# Patient Record
Sex: FEMALE | Race: WHITE | NOT HISPANIC OR LATINO | Employment: FULL TIME | URBAN - METROPOLITAN AREA
[De-identification: names, ages, dates, MRNs, and addresses within clinical notes are randomized per-mention and may not be internally consistent; named-entity substitution may affect disease eponyms.]

---
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
## 2018-01-03 ENCOUNTER — PATIENT MESSAGE (OUTPATIENT)
Dept: MEDICAL GROUP | Facility: MEDICAL CENTER | Age: 18
End: 2018-01-03

## 2018-01-03 DIAGNOSIS — A09 TRAVELER'S DIARRHEA: ICD-10-CM

## 2018-01-03 RX ORDER — CIPROFLOXACIN 500 MG/1
500 TABLET, FILM COATED ORAL 2 TIMES DAILY
Qty: 6 TAB | Refills: 0 | Status: SHIPPED | OUTPATIENT
Start: 2018-01-03 | End: 2018-04-08

## 2018-01-03 NOTE — TELEPHONE ENCOUNTER
From: José Manuel Damon  To: HILARIA Medina  Sent: 1/3/2018 11:05 AM PST  Subject: Prescription Question    Josue Schrader! I leave for [place] in a week, and I was wondering if you could send in a prescription for travelers diarrhea for me. Thanks,  José Manuel

## 2018-04-08 ENCOUNTER — OFFICE VISIT (OUTPATIENT)
Dept: URGENT CARE | Facility: CLINIC | Age: 18
End: 2018-04-08
Payer: COMMERCIAL

## 2018-04-08 VITALS
RESPIRATION RATE: 16 BRPM | HEIGHT: 62 IN | WEIGHT: 132.8 LBS | DIASTOLIC BLOOD PRESSURE: 72 MMHG | HEART RATE: 80 BPM | TEMPERATURE: 98.6 F | BODY MASS INDEX: 24.44 KG/M2 | OXYGEN SATURATION: 98 % | SYSTOLIC BLOOD PRESSURE: 102 MMHG

## 2018-04-08 DIAGNOSIS — J02.9 PHARYNGITIS, UNSPECIFIED ETIOLOGY: ICD-10-CM

## 2018-04-08 LAB
INT CON NEG: NORMAL
INT CON POS: NORMAL
S PYO AG THROAT QL: NEGATIVE

## 2018-04-08 PROCEDURE — 99213 OFFICE O/P EST LOW 20 MIN: CPT | Performed by: NURSE PRACTITIONER

## 2018-04-08 PROCEDURE — 87880 STREP A ASSAY W/OPTIC: CPT | Performed by: NURSE PRACTITIONER

## 2018-04-08 ASSESSMENT — ENCOUNTER SYMPTOMS
STRIDOR: 0
HEADACHES: 1
VOMITING: 0
SWOLLEN GLANDS: 0
NECK PAIN: 0
DIARRHEA: 0
TROUBLE SWALLOWING: 0
SHORTNESS OF BREATH: 0
ABDOMINAL PAIN: 0
HOARSE VOICE: 1
COUGH: 0

## 2018-04-08 ASSESSMENT — PAIN SCALES - GENERAL: PAINLEVEL: NO PAIN

## 2018-04-08 NOTE — PROGRESS NOTES
"Subjective:      José Manuel Damon is a 17 y.o. female who presents with Pharyngitis (x 2 weeks / ear pain )       Denies past medical, surgical or family history that is significant to today's problem.   RX or OTC medications reviewed with patient today.     Allergies   Allergen Reactions   • Carmel [Drospirenone-Ethinyl Estradiol] Swelling     Facial swelling             Pharyngitis    This is a new problem. The current episode started 1 to 4 weeks ago. The problem has been waxing and waning. Neither side of throat is experiencing more pain than the other. Maximum temperature: subjective. The pain is at a severity of 3/10. The pain is mild. Associated symptoms include headaches, a hoarse voice and a plugged ear sensation. Pertinent negatives include no abdominal pain, congestion, coughing, diarrhea, drooling, ear discharge, ear pain, neck pain, shortness of breath, stridor, swollen glands, trouble swallowing or vomiting. She has tried nothing for the symptoms. The treatment provided mild relief.       Review of Systems   HENT: Positive for hoarse voice. Negative for congestion, drooling, ear discharge, ear pain and trouble swallowing.    Respiratory: Negative for cough, shortness of breath and stridor.    Gastrointestinal: Negative for abdominal pain, diarrhea and vomiting.   Musculoskeletal: Negative for neck pain.   Neurological: Positive for headaches.          Objective:     /72   Pulse 80   Temp 37 °C (98.6 °F)   Resp 16   Ht 1.575 m (5' 2\")   Wt 60.2 kg (132 lb 12.8 oz)   SpO2 98%   BMI 24.29 kg/m²      Physical Exam   Constitutional: She is oriented to person, place, and time. Vital signs are normal. She appears well-developed and well-nourished.  Non-toxic appearance. No distress.   HENT:   Head: Normocephalic.   Right Ear: Hearing, tympanic membrane, external ear and ear canal normal.   Left Ear: Hearing, tympanic membrane, external ear and ear canal normal.   Nose: Nose normal. Right sinus " exhibits no frontal sinus tenderness. Left sinus exhibits no frontal sinus tenderness.   Mouth/Throat: Uvula is midline, oropharynx is clear and moist and mucous membranes are normal. No oropharyngeal exudate, posterior oropharyngeal edema, posterior oropharyngeal erythema or tonsillar abscesses. Tonsils are 2+ on the right. Tonsils are 2+ on the left. No tonsillar exudate.   Eyes: Lids are normal. Pupils are equal, round, and reactive to light.   Neck: Trachea normal, normal range of motion, full passive range of motion without pain and phonation normal. Neck supple.   Cardiovascular: Normal rate, regular rhythm and normal pulses.    Pulmonary/Chest: Effort normal and breath sounds normal. No respiratory distress.   Abdominal: Soft.   Musculoskeletal: Normal range of motion.   Lymphadenopathy:        Head (right side): No submental, no submandibular and no tonsillar adenopathy present.        Head (left side): No submental, no submandibular and no tonsillar adenopathy present.     She has no cervical adenopathy.        Right: No supraclavicular adenopathy present.        Left: No supraclavicular adenopathy present.   Neurological: She is alert and oriented to person, place, and time. She has normal reflexes.   Skin: Skin is warm and dry.   Psychiatric: She has a normal mood and affect. Her speech is normal and behavior is normal.   Nursing note and vitals reviewed.       strep : negative        Assessment/Plan:     1. Pharyngitis, unspecified etiology  Alum & Mag Hydroxide-Simeth (MBX) Suspension    POCT Rapid Strep A     Return to clinic or PCP  4-5  days if current symptoms are not resolving in a satisfactory manner or sooner if new or worsening symptoms occur.   Patient advised differential diagnoses, signs and symptoms which would warrant further evaluation and /or emergent evaluation.     Patient was in agreement with this treatment plan and seemed to understand without barriers.   Questions were encouraged  and answered to patients satisfaction.   Pt education done. Aftercare instructions given to pt/ caregiver. Questions answered. Verbalizes good understanding.   Salt water gargles BID and prn. Suggested 1/4 to 1/2 teaspoon (1.5 to 3.0 g) of salt per one cup (8 ounces or 250 mL) of warm water    Keep well hydrated

## 2018-06-27 ENCOUNTER — OFFICE VISIT (OUTPATIENT)
Dept: MEDICAL GROUP | Age: 18
End: 2018-06-27
Payer: COMMERCIAL

## 2018-06-27 VITALS
OXYGEN SATURATION: 96 % | BODY MASS INDEX: 25.4 KG/M2 | TEMPERATURE: 99.4 F | HEART RATE: 62 BPM | DIASTOLIC BLOOD PRESSURE: 60 MMHG | SYSTOLIC BLOOD PRESSURE: 102 MMHG | WEIGHT: 138 LBS | HEIGHT: 62 IN

## 2018-06-27 DIAGNOSIS — J40 BRONCHITIS: ICD-10-CM

## 2018-06-27 PROCEDURE — 99214 OFFICE O/P EST MOD 30 MIN: CPT | Performed by: FAMILY MEDICINE

## 2018-06-27 RX ORDER — PREDNISONE 20 MG/1
TABLET ORAL
Qty: 12 TAB | Refills: 0 | Status: SHIPPED | OUTPATIENT
Start: 2018-06-27 | End: 2019-03-26

## 2018-06-27 RX ORDER — AMOXICILLIN 875 MG/1
875 TABLET, COATED ORAL 2 TIMES DAILY
Qty: 20 TAB | Refills: 0 | Status: SHIPPED | OUTPATIENT
Start: 2018-06-27 | End: 2019-03-26

## 2018-06-27 ASSESSMENT — PATIENT HEALTH QUESTIONNAIRE - PHQ9: CLINICAL INTERPRETATION OF PHQ2 SCORE: 0

## 2018-06-27 NOTE — PROGRESS NOTES
This medical record contains text that has been entered with the assistance of computer voice recognition and dictation software.  Therefore, it may contain unintended errors in text, spelling, punctuation, or grammar    Chief Complaint   Patient presents with   • Cough     x2 weeks, not productive   • Nasal Congestion     x2 weeks         José Manuel Damon is a 17 y.o. female here evaluation and management of: Cough Raynaud's itchy eyes ear pain congestion times 2 weeks      HPI:     Bronchitis  NEW PROBLEM    Patient states for the past 2 weeks she has been coughing of yellowish sputum, this is associated with a runny nose bilateral ear pain feeling feverish although she did not measure it.  In addition to having generalized malaise she also feels nauseous.  Denies any headaches no numbness or tingling no neck stiffness no nausea no vomiting no diarrhea no abdominal pain no blood in the stool no vaginal bleeding, she is able to tolerate p.o. fluids.the cough which is causing soreness in her throat. The patient feels congested through the  frontal sinuses as well as maxillary sinuses. Has been trying over-the-counter remedies with no improvement.  The patient  denies any neck stiffness no change in vision no severe headaches, no nausea no vomiting is able to tolerate by mouth fluids.    Current medicines (including changes today)  Current Outpatient Prescriptions   Medication Sig Dispense Refill   • amoxicillin (AMOXIL) 875 MG tablet Take 1 Tab by mouth 2 times a day. 20 Tab 0   • predniSONE (DELTASONE) 20 MG Tab Take 3 tabs po for 2 days then 2 tabs for 2 days then 1 for 2 days 12 Tab 0   • Alum & Mag Hydroxide-Simeth (MBX) Suspension Take 5 mL by mouth every 6 hours as needed. 120 mL 0   • fluticasone (FLONASE) 50 MCG/ACT nasal spray USE 2 SPRAYS NASALLY EVERY DAY 1 Bottle 5   • BLISOVI FE 1/20 1-20 MG-MCG per tablet Take  by mouth every day.       No current facility-administered medications for this visit.   "    She  has a past medical history of Allergy and Anxiety.  She  has a past surgical history that includes tonsillectomy and adenoidectomy and tonsillectomy.  Social History   Substance Use Topics   • Smoking status: Former Smoker   • Smokeless tobacco: Never Used   • Alcohol use 0.6 oz/week     1 Cans of beer per week     Social History     Social History Narrative   • No narrative on file     Family History   Problem Relation Age of Onset   • Hypertension Mother    • Hyperlipidemia Mother    • Psychiatry Father    • Hypertension Maternal Grandmother    • Diabetes Maternal Grandmother    • Heart Disease Maternal Grandfather      Family Status   Relation Status   • Mother Alive   • Father Alive   • Sister Alive   • Maternal Grandmother Alive   • Maternal Grandfather    • Paternal Grandmother Alive         ROS    Please see hpi     All other systems reviewed and are negative     Objective:     Blood pressure 102/60, pulse 62, temperature 37.4 °C (99.4 °F), height 1.575 m (5' 2\"), weight 62.6 kg (138 lb), SpO2 96 %, not currently breastfeeding. Body mass index is 25.24 kg/m².  Physical Exam:    Constitutional: Alert, no distress.  Skin: Warm, dry, good turgor, no rashes in visible areas.  Eye: Equal, round and reactive, conjunctiva clear, lids normal.  ENMT: Lips without lesions, good dentition, oropharynx clear.  Neck: Trachea midline, no masses, no thyromegaly. No cervical or supraclavicular lymphadenopathy.  Respiratory: Unlabored respiratory effort, lungs clear to auscultation, no wheezes, no ronchi.  Cardiovascular: Normal S1, S2, no murmur, no edema.  Abdomen: Soft, non-tender, no masses, no hepatosplenomegaly.  Psych: Alert and oriented x3, normal affect and mood.          Assessment and Plan:   The following treatment plan was discussed      1. Bronchitis    The patient is nontoxic in appearance  Afebrile and  hemodynamically stable  There is no clinical indication for  imaging  We will proceed with " conservative management as an outpatient      - amoxicillin (AMOXIL) 875 MG tablet; Take 1 Tab by mouth 2 times a day.  Dispense: 20 Tab; Refill: 0  - predniSONE (DELTASONE) 20 MG Tab; Take 3 tabs po for 2 days then 2 tabs for 2 days then 1 for 2 days  Dispense: 12 Tab; Refill: 0        HEALTH MAINTENANCE:    Instructed to Follow up in clinic or ER for worsening symptoms, difficulty breathing, lack of expected recovery, or should new symptoms or problems arise.    Followup: Return in about 4 weeks (around 7/25/2018) for Reevaluation, With PCP.       Once again this medical record contains text that has been entered with the assistance of computer voice recognition and dictation software.  Therefore, it may contain unintended errors in text, spelling, punctuation, or grammar

## 2018-06-27 NOTE — ASSESSMENT & PLAN NOTE
NEW PROBLEM    Patient states for the past 2 weeks she has been coughing of yellowish sputum, this is associated with a runny nose bilateral ear pain feeling feverish although she did not measure it.  In addition to having generalized malaise she also feels nauseous.  Denies any headaches no numbness or tingling no neck stiffness no nausea no vomiting no diarrhea no abdominal pain no blood in the stool no vaginal bleeding, she is able to tolerate p.o. fluids.the cough which is causing soreness in her throat. The patient feels congested through the  frontal sinuses as well as maxillary sinuses. Has been trying over-the-counter remedies with no improvement.  The patient  denies any neck stiffness no change in vision no severe headaches, no nausea no vomiting is able to tolerate by mouth fluids.

## 2018-07-05 ENCOUNTER — PATIENT MESSAGE (OUTPATIENT)
Dept: MEDICAL GROUP | Facility: MEDICAL CENTER | Age: 18
End: 2018-07-05

## 2018-07-05 RX ORDER — CEFDINIR 300 MG/1
300 CAPSULE ORAL 2 TIMES DAILY
Qty: 20 CAP | Refills: 0 | Status: SHIPPED | OUTPATIENT
Start: 2018-07-05 | End: 2019-03-26

## 2018-07-29 ENCOUNTER — PATIENT MESSAGE (OUTPATIENT)
Dept: MEDICAL GROUP | Facility: MEDICAL CENTER | Age: 18
End: 2018-07-29

## 2018-07-30 NOTE — TELEPHONE ENCOUNTER
From: José Manuel Damon  To: HILARIA Medina  Sent: 7/29/2018 3:30 PM PDT  Subject: Non-Urgent Medical Question    I called and got an appointment scheduled with you.

## 2018-08-15 ENCOUNTER — OFFICE VISIT (OUTPATIENT)
Dept: MEDICAL GROUP | Facility: MEDICAL CENTER | Age: 18
End: 2018-08-15
Payer: COMMERCIAL

## 2018-08-15 VITALS
WEIGHT: 135 LBS | OXYGEN SATURATION: 95 % | HEIGHT: 62 IN | SYSTOLIC BLOOD PRESSURE: 100 MMHG | DIASTOLIC BLOOD PRESSURE: 60 MMHG | BODY MASS INDEX: 24.84 KG/M2 | HEART RATE: 94 BPM | RESPIRATION RATE: 16 BRPM | TEMPERATURE: 99.1 F

## 2018-08-15 DIAGNOSIS — Z23 NEED FOR VACCINATION: ICD-10-CM

## 2018-08-15 DIAGNOSIS — Z00.00 ANNUAL PHYSICAL EXAM: ICD-10-CM

## 2018-08-15 DIAGNOSIS — J30.1 SEASONAL ALLERGIC RHINITIS DUE TO POLLEN: ICD-10-CM

## 2018-08-15 PROCEDURE — 90471 IMMUNIZATION ADMIN: CPT | Performed by: NURSE PRACTITIONER

## 2018-08-15 PROCEDURE — 99395 PREV VISIT EST AGE 18-39: CPT | Mod: 25 | Performed by: NURSE PRACTITIONER

## 2018-08-15 PROCEDURE — 90621 MENB-FHBP VACC 2/3 DOSE IM: CPT | Performed by: NURSE PRACTITIONER

## 2018-08-15 RX ORDER — FLUTICASONE PROPIONATE 50 MCG
1 SPRAY, SUSPENSION (ML) NASAL DAILY
Qty: 1 BOTTLE | Refills: 1 | Status: SHIPPED | OUTPATIENT
Start: 2018-08-15 | End: 2019-03-26

## 2018-08-15 NOTE — PROGRESS NOTES
Chief Complaint   Patient presents with   • Annual Exam     José Manuel Damon is a 17 y.o. female here for annual exam.  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  She is exercising regularly, generally following a healthy diet.  Using Flonase for her allergies which is working well.  On oral contraceptive and also doing well with this, followed by OB/GYN.  Not currently sexually active    No problem-specific Assessment & Plan notes found for this encounter.    Current medicines (including changes today)  Current Outpatient Prescriptions   Medication Sig Dispense Refill   • fluticasone (FLONASE) 50 MCG/ACT nasal spray Spray 1 Spray in nose every day. 1 Bottle 1   • BLISOVI FE 1/20 1-20 MG-MCG per tablet Take  by mouth every day.     • cefdinir (OMNICEF) 300 MG Cap Take 1 Cap by mouth 2 times a day. 20 Cap 0   • amoxicillin (AMOXIL) 875 MG tablet Take 1 Tab by mouth 2 times a day. 20 Tab 0   • predniSONE (DELTASONE) 20 MG Tab Take 3 tabs po for 2 days then 2 tabs for 2 days then 1 for 2 days 12 Tab 0   • Alum & Mag Hydroxide-Simeth (MBX) Suspension Take 5 mL by mouth every 6 hours as needed. 120 mL 0     No current facility-administered medications for this visit.      She  has a past medical history of Allergy and Anxiety.  She  has a past surgical history that includes tonsillectomy and adenoidectomy and tonsillectomy.  Social History   Substance Use Topics   • Smoking status: Former Smoker   • Smokeless tobacco: Never Used   • Alcohol use 0.6 oz/week     1 Cans of beer per week     Social History     Social History Narrative   • No narrative on file     Family History   Problem Relation Age of Onset   • Hypertension Mother    • Hyperlipidemia Mother    • Psychiatry Father    • Hypertension Maternal Grandmother    • Diabetes Maternal Grandmother    • Heart Disease Maternal Grandfather      Family Status   Relation Status   • Mo Alive   • Fa Alive   • Sis  "Alive   • MGMo Alive   • MGFa    • PGMo Alive         ROS  Problems listed discussed above, all other systems reviewed and negative     Objective:     Blood pressure 100/60, pulse 94, temperature 37.3 °C (99.1 °F), resp. rate 16, height 1.575 m (5' 2\"), weight 61.2 kg (135 lb), SpO2 95 %. Body mass index is 24.69 kg/m².  Physical Exam:  General: Alert, oriented in no acute distress.  Eye contact is good, speech is normal, affect calm  HEENT: perrl, Oral mucosa pink moist, no lesions. Nares patent. TMs gray with good landmarks bilaterally. No cervical or supraclavicular lymphadenopathy, thyroid isthmus palpable without masses or nodules.  Lungs: clear to auscultation bilaterally, good aeration, normal effort. No wheeze/ rhonchi/ rales.  CV: regular rate and rhythm, S1, S2. No murmur, no JVD, no edema. Pedal pulses 2 + bilaterally  Abdomen: soft, nontender, BS x4, no hepatosplenomegaly.  Ext: color normal, vascularity normal, temperature normal. No rash or lesions.  MS:  No joint swelling or redness. Strength is 5/5 globally  Neuro: DTR 2+ bilaterally  Assessment and Plan:   The following treatment plan was discussed  1. Annual physical exam  Normal physical exam. General health and wellness discussion including healthy diet, regular exercise. 2000 iu Vit d3 advised daily.  Plan for Pap at age 19. Advised regular dental cleanings, eye exam yearly.   2. Seasonal allergic rhinitis due to pollen   stable  fluticasone (FLONASE) 50 MCG/ACT nasal spray   3. Need for vaccination  I have placed the below orders and discussed them with an approved delegating provider. The MA is performing the below orders under the direction of Dr. Zaragoza  Meningococcal (IM) Group B       Educated in proper administration of medication(s) ordered today including safety, possible SE, risks, benefits, rationale and alternatives to therapy.     Followup: annually             Please note that this dictation was created using voice " recognition software. I have worked with consultants from the vendor as well as technical experts from [facility] to optimize the interface. I have made every reasonable attempt to correct obvious errors, but I expect that there are errors of grammar and possibly content that I did not discover before finalizing the note.

## 2019-03-12 ENCOUNTER — PATIENT MESSAGE (OUTPATIENT)
Dept: MEDICAL GROUP | Facility: MEDICAL CENTER | Age: 19
End: 2019-03-12

## 2019-03-12 DIAGNOSIS — R21 RASH OF UNKNOWN CAUSE: ICD-10-CM

## 2019-03-12 NOTE — TELEPHONE ENCOUNTER
From: José Manuel Damon  To: HILARIA Medina  Sent: 3/12/2019 10:55 AM PDT  Subject: RE: Non-Urgent Medical Question    Okay, thanks.   Yes, please place the referral for me!   Have a good day!     ----- Message -----  From: HILARIA Medina  Sent: 3/12/19, 10:32 AM  To: José Manuel RAYMONFerny Damon  Subject: RE: Non-Urgent Medical Question    Josue Georges,  I can certainly place a referral for you to see an allergy specialist, but I do have some suggestions for you in the meantime. Sometimes rash, especially in the winter, is more of a sign of excessively dry skin than an allergy. Avoiding any soap on your arms, legs, trunk, can help. Only use soap in the underarms or genitals. Also make sure that you are not showering with hot water as this can make dry skin or dermatitis worse, try to use lukewarm water. You can also use a thick daily moisturizer as soon as you get out of the shower to help seal moisture into the skin. Taking Zyrtec, which is an over-the-counter allergy medicine, actually helps with rashes as well. Would you like me to start the allergy referral?  Macrina BENTLEY      ----- Message -----   From: José Manuel RAYMONFerny Damon   Sent: 3/12/2019 9:13 AM PDT   To: HILARIA Medina  Subject: Non-Urgent Medical Question    Josue Schrader,  I am messaging you regarding allergies I've been having recently. I don't know if I need to get a referral from you or if you can help me yourself, just let me know what you think is best. I've been getting rashes/itchy & irritated every day after I workout and then after I shower. I've tried different soaps and nothing has changed. I'd like to get my allergies tested and figure out what's going on. Let me know what I should do.   Thanks,   José Manuel

## 2019-03-12 NOTE — TELEPHONE ENCOUNTER
From: José Manuel Damon  To: HILARIA Medina  Sent: 3/12/2019 9:13 AM PDT  Subject: Non-Urgent Medical Question    Josue Schrader,  I am messaging you regarding allergies I've been having recently. I don't know if I need to get a referral from you or if you can help me yourself, just let me know what you think is best. I've been getting rashes/itchy & irritated every day after I workout and then after I shower. I've tried different soaps and nothing has changed. I'd like to get my allergies tested and figure out what's going on. Let me know what I should do.   Thanks,   José Manuel

## 2019-03-26 ENCOUNTER — OFFICE VISIT (OUTPATIENT)
Dept: URGENT CARE | Facility: CLINIC | Age: 19
End: 2019-03-26
Payer: COMMERCIAL

## 2019-03-26 VITALS
SYSTOLIC BLOOD PRESSURE: 112 MMHG | DIASTOLIC BLOOD PRESSURE: 62 MMHG | HEIGHT: 62 IN | HEART RATE: 68 BPM | TEMPERATURE: 98 F | OXYGEN SATURATION: 94 % | RESPIRATION RATE: 16 BRPM | BODY MASS INDEX: 24.84 KG/M2 | WEIGHT: 135 LBS

## 2019-03-26 DIAGNOSIS — H65.93 FLUID LEVEL BEHIND TYMPANIC MEMBRANE OF BOTH EARS: ICD-10-CM

## 2019-03-26 DIAGNOSIS — J06.9 URI WITH COUGH AND CONGESTION: ICD-10-CM

## 2019-03-26 PROCEDURE — 99214 OFFICE O/P EST MOD 30 MIN: CPT | Performed by: PHYSICIAN ASSISTANT

## 2019-03-26 RX ORDER — FLUTICASONE PROPIONATE 50 MCG
1 SPRAY, SUSPENSION (ML) NASAL 2 TIMES DAILY
Qty: 1 BOTTLE | Refills: 1 | Status: SHIPPED | OUTPATIENT
Start: 2019-03-26 | End: 2019-09-05

## 2019-03-26 RX ORDER — CODEINE PHOSPHATE AND GUAIFENESIN 10; 100 MG/5ML; MG/5ML
5 SOLUTION ORAL EVERY 12 HOURS PRN
Qty: 100 ML | Refills: 0 | Status: SHIPPED | OUTPATIENT
Start: 2019-03-26 | End: 2019-04-05

## 2019-03-26 RX ORDER — CHLORPHENIRAMINE MALEATE 4 MG/1
4 TABLET ORAL EVERY 6 HOURS PRN
Qty: 30 TAB | Refills: 0 | Status: SHIPPED | OUTPATIENT
Start: 2019-03-26 | End: 2019-09-05

## 2019-03-26 NOTE — PROGRESS NOTES
Chief Complaint   Patient presents with   • URI     x 4 days with cough, congestion, sore throat and bilateral ear pain       HISTORY OF PRESENT ILLNESS: Patient is a 18 y.o. female who presents today for the following:    Cough x 4-5 days  + nasal congestion, ear pressure, ST, body aches  OTC meds tried: sudafed, theraflu  Denies h/o asthma   Not sleeping due to cough    Patient Active Problem List    Diagnosis Date Noted   • Bronchitis 2018   • Fatigue 2015   • Lesion of nipple 2015       Allergies:Carmel [drospirenone-ethinyl estradiol]    Current Outpatient Prescriptions Ordered in [facility]   Medication Sig Dispense Refill   • chlorpheniramine (CHLOR-TRIMETRON) 4 MG Tab Take 1 Tab by mouth every 6 hours as needed (nasal congestion/ear pressure). 30 Tab 0   • fluticasone (FLONASE) 50 MCG/ACT nasal spray Spray 1 Spray in nose 2 times a day. 1 Bottle 1   • guaifenesin-codeine (ROBITUSSIN AC) Solution oral solution Take 5 mL by mouth every 12 hours as needed for Cough for up to 10 days. 100 mL 0   • BLISOVI FE  1-20 MG-MCG per tablet Take  by mouth every day.       No current [facility]-ordered facility-administered medications on file.        Past Medical History:   Diagnosis Date   • Allergy    • Anxiety        Social History   Substance Use Topics   • Smoking status: Former Smoker   • Smokeless tobacco: Never Used   • Alcohol use 0.6 oz/week     1 Cans of beer per week       Family Status   Relation Status   • Mo Alive   • Fa Alive   • Sis Alive   • MGMo Alive   • MGFa    • PGMo Alive     Family History   Problem Relation Age of Onset   • Hypertension Mother    • Hyperlipidemia Mother    • Psychiatry Father    • Hypertension Maternal Grandmother    • Diabetes Maternal Grandmother    • Heart Disease Maternal Grandfather        Review of Systems:   Constitutional ROS: No unexpected change in weight, No weakness, No fatigue  Eye ROS: No recent significant change in vision, No eye pain, redness,  "discharge  Ear ROS: No drainage, No tinnitus or vertigo, No recent change in hearing  Mouth/Throat ROS: No teeth or gum problems, No bleeding gums, No tongue complaints  Neck ROS: No swollen glands, No significant pain in neck  Pulmonary ROS: No chronic cough, sputum, or hemoptysis, No dyspnea on exertion, No wheezing  Cardiovascular ROS: No diaphoresis, No edema, No palpitations  Gastrointestinal ROS: No change in bowel habits, No significant change in appetite, No nausea, vomiting, diarrhea, or constipation  Musculoskeletal/Extremities ROS: No peripheral edema, No pain, redness or swelling on the joints  Hematologic/Lymphatic ROS: Positive for body aches.  Skin/Integumentary ROS: No edema, No evidence of rash, No itching      Exam:  Blood pressure 112/62, pulse 68, temperature 36.7 °C (98 °F), temperature source Temporal, resp. rate 16, height 1.575 m (5' 2\"), weight 61.2 kg (135 lb), SpO2 94 %, not currently breastfeeding.  General: Well developed, well nourished. No distress.  Eye: PERRL/EOMI; conjunctivae clear, lids normal.  ENMT: Lips without lesions, MMM. Oropharynx is clear. Bilateral TMs are within normal limits but with clear fluid posteriorly bilaterally and bulging.   Pulmonary: Unlabored respiratory effort. Lungs clear to auscultation, no wheezes, no rhonchi.  Cardiovascular: Regular rate and rhythm without murmur.     Neurologic: Grossly nonfocal. No facial asymmetry noted.  Lymph: No cervical lymphadenopathy noted.  Skin: Warm, dry, good turgor. No rashes in visible areas.   Psych: Normal mood. Alert and oriented x3. Judgment and insight is normal.    Assessment/Plan:  Discussed likely viral etiology.  Use all medication as directed. Follow up for worsening or persistent symptoms.  1. URI with cough and congestion  guaifenesin-codeine (ROBITUSSIN AC) Solution oral solution   2. Fluid level behind tympanic membrane of both ears  chlorpheniramine (CHLOR-TRIMETRON) 4 MG Tab    fluticasone (FLONASE) 50 " MCG/ACT nasal spray

## 2019-06-13 ENCOUNTER — PATIENT MESSAGE (OUTPATIENT)
Dept: MEDICAL GROUP | Facility: MEDICAL CENTER | Age: 19
End: 2019-06-13

## 2019-06-13 RX ORDER — PERMETHRIN 50 MG/G
CREAM TOPICAL
Qty: 1 TUBE | Refills: 2 | Status: SHIPPED | OUTPATIENT
Start: 2019-06-13 | End: 2019-09-05

## 2019-06-13 NOTE — TELEPHONE ENCOUNTER
From: José Manuel Damon  To: HILARIA Medina  Sent: 6/13/2019 1:46 PM PDT  Subject: Non-Urgent Medical Question    Good afternoon Macrina,  I have a concern I wanted to come to you with. My friend recently visited me and was worried she had gotten scabies from camping a few weeks prior. She said she used Permethrin before visiting me, but is recommending I use Permethrin just in case. Do you think this is necessary?   Thanks,  José Manuel Damon

## 2019-08-01 ENCOUNTER — APPOINTMENT (OUTPATIENT)
Dept: URGENT CARE | Facility: CLINIC | Age: 19
End: 2019-08-01
Payer: COMMERCIAL

## 2019-08-02 ENCOUNTER — OFFICE VISIT (OUTPATIENT)
Dept: URGENT CARE | Facility: CLINIC | Age: 19
End: 2019-08-02
Payer: COMMERCIAL

## 2019-08-02 VITALS
HEART RATE: 68 BPM | HEIGHT: 62 IN | WEIGHT: 129.8 LBS | SYSTOLIC BLOOD PRESSURE: 120 MMHG | TEMPERATURE: 98 F | DIASTOLIC BLOOD PRESSURE: 70 MMHG | OXYGEN SATURATION: 99 % | BODY MASS INDEX: 23.89 KG/M2 | RESPIRATION RATE: 16 BRPM

## 2019-08-02 DIAGNOSIS — S80.869A INSECT BITE OF LOWER LEG, UNSPECIFIED LATERALITY, INITIAL ENCOUNTER: ICD-10-CM
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
## 2019-08-02 PROCEDURE — 99214 OFFICE O/P EST MOD 30 MIN: CPT | Performed by: PHYSICIAN ASSISTANT

## 2019-08-02 RX ORDER — MUPIROCIN CALCIUM 20 MG/G
1 CREAM TOPICAL 2 TIMES DAILY
Qty: 1 TUBE | Refills: 0 | Status: SHIPPED | OUTPATIENT
Start: 2019-08-02 | End: 2019-09-05

## 2019-08-02 ASSESSMENT — ENCOUNTER SYMPTOMS
NAIL CHANGES: 0
VOMITING: 0
SHORTNESS OF BREATH: 0
SPUTUM PRODUCTION: 0
MUSCULOSKELETAL NEGATIVE: 1
DIARRHEA: 0
DIZZINESS: 0
FEVER: 0
CHILLS: 0
ABDOMINAL PAIN: 0

## 2019-08-02 NOTE — PROGRESS NOTES
"Subjective:      José Manuel Damon is a 18 y.o. female who presents with Rash (x 5 days, rash in both legs)            Rash   This is a new problem. The current episode started in the past 7 days (5 days). The problem is unchanged. The affected locations include the left lower leg and right lower leg. The rash is characterized by pain and redness. She was exposed to nothing. Pertinent negatives include no congestion, diarrhea, fever, joint pain, nail changes, shortness of breath or vomiting. Past treatments include nothing. There is no history of allergies, asthma or eczema.     Patient presents to urgent care reporting a 5 day history of 3 small raised areas of redness on her lower legs. They are slightly painful, no itchiness. No history of skin problems. They recently started draining a small amount of fluid. She is otherwise feeling well and denies any other symptoms.     Review of Systems   Constitutional: Negative for chills and fever.   HENT: Negative for congestion.    Respiratory: Negative for sputum production and shortness of breath.    Cardiovascular: Negative for chest pain.   Gastrointestinal: Negative for abdominal pain, diarrhea and vomiting.   Genitourinary: Negative.    Musculoskeletal: Negative.  Negative for joint pain.   Skin: Positive for rash. Negative for itching and nail changes.   Neurological: Negative for dizziness.        Objective:     /70 (BP Location: Left arm, Patient Position: Sitting, BP Cuff Size: Adult)   Pulse 68   Temp 36.7 °C (98 °F) (Temporal)   Resp 16   Ht 1.575 m (5' 2\")   Wt 58.9 kg (129 lb 12.8 oz)   SpO2 99%   BMI 23.74 kg/m²      Physical Exam   Constitutional: She is oriented to person, place, and time. She appears well-developed and well-nourished. No distress.   HENT:   Head: Normocephalic and atraumatic.   Eyes: Pupils are equal, round, and reactive to light. Conjunctivae are normal.   Neck: Normal range of motion.   Cardiovascular: Normal rate. "   Pulmonary/Chest: Effort normal.   Musculoskeletal: Normal range of motion.   Neurological: She is alert and oriented to person, place, and time.   Skin: Skin is warm and dry. She is not diaphoretic. There is erythema.        3 small areas of raised erythema present on lower extremities. Slight TTP. No surrounding erythema, edema, or warmth to touch. No induration or fluctuance present.    Psychiatric: She has a normal mood and affect. Her behavior is normal.   Nursing note and vitals reviewed.       PMH:  has a past medical history of Allergy and Anxiety.  MEDS:   Current Outpatient Medications:   •  mupirocin calcium (BACTROBAN) 2 % Cream, Apply 1 Application to affected area(s) 2 times a day., Disp: 1 Tube, Rfl: 0  •  BLISOVI FE 1/20 1-20 MG-MCG per tablet, Take  by mouth every day., Disp: , Rfl:   •  permethrin (ELIMITE) 5 % Cream, Apply cream to entire body, leave on at least 8 hours before showering (Patient not taking: Reported on 8/2/2019), Disp: 1 Tube, Rfl: 2  •  chlorpheniramine (CHLOR-TRIMETRON) 4 MG Tab, Take 1 Tab by mouth every 6 hours as needed (nasal congestion/ear pressure)., Disp: 30 Tab, Rfl: 0  •  fluticasone (FLONASE) 50 MCG/ACT nasal spray, Spray 1 Spray in nose 2 times a day. (Patient not taking: Reported on 8/2/2019), Disp: 1 Bottle, Rfl: 1  ALLERGIES:   Allergies   Allergen Reactions   • Carmel [Drospirenone-Ethinyl Estradiol] Swelling     Facial swelling     SURGHX:   Past Surgical History:   Procedure Laterality Date   • TONSILLECTOMY     • TONSILLECTOMY AND ADENOIDECTOMY       SOCHX:  reports that she has quit smoking. She has never used smokeless tobacco. She reports that she drinks about 0.6 oz of alcohol per week. She reports that she has current or past drug history. Drug: Marijuana.  FH: family history includes Diabetes in her maternal grandmother; Heart Disease in her maternal grandfather; Hyperlipidemia in her mother; Hypertension in her maternal grandmother and mother; Psychiatric  Illness in her father.       Assessment/Plan:     1. Insect bite of lower leg, unspecified laterality, initial encounter  - mupirocin calcium (BACTROBAN) 2 % Cream; Apply 1 Application to affected area(s) 2 times a day.  Dispense: 1 Tube; Refill: 0    Encouraged to keep areas clean and dry. Apply Bactroban ointment 1-2 times daily. Monitor closely and RTC or follow up with primary care if symptoms persist/worsen. The patient demonstrated a good understanding and agreed with the treatment plan.

## 2019-08-31 ENCOUNTER — PATIENT MESSAGE (OUTPATIENT)
Dept: MEDICAL GROUP | Facility: MEDICAL CENTER | Age: 19
End: 2019-08-31

## 2019-09-03 NOTE — TELEPHONE ENCOUNTER
From: José Manuel Damon  To: HILARIA Medina  Sent: 8/31/2019 9:03 AM PDT  Subject: Non-Urgent Medical Question    Josue Schrader,  I was wondering if you could send in a referral for me to get blood work done. I'm feeling fine, I just want to get it done because it's been a long time.   Thank you,  José Manuel Damon

## 2019-09-05 ENCOUNTER — OFFICE VISIT (OUTPATIENT)
Dept: MEDICAL GROUP | Facility: MEDICAL CENTER | Age: 19
End: 2019-09-05
Payer: COMMERCIAL

## 2019-09-05 VITALS
OXYGEN SATURATION: 98 % | SYSTOLIC BLOOD PRESSURE: 104 MMHG | WEIGHT: 129 LBS | BODY MASS INDEX: 23.74 KG/M2 | HEART RATE: 62 BPM | RESPIRATION RATE: 16 BRPM | TEMPERATURE: 98.3 F | HEIGHT: 62 IN | DIASTOLIC BLOOD PRESSURE: 60 MMHG

## 2019-09-05 DIAGNOSIS — Z13.21 ENCOUNTER FOR VITAMIN DEFICIENCY SCREENING: ICD-10-CM

## 2019-09-05 DIAGNOSIS — Z83.49 FAMILY HISTORY OF THYROID DISEASE: ICD-10-CM

## 2019-09-05 DIAGNOSIS — Z13.0 SCREENING FOR DEFICIENCY ANEMIA: ICD-10-CM

## 2019-09-05 DIAGNOSIS — Z00.00 ANNUAL PHYSICAL EXAM: ICD-10-CM

## 2019-09-05 DIAGNOSIS — Z13.220 LIPID SCREENING: ICD-10-CM

## 2019-09-05 DIAGNOSIS — Z13.29 THYROID DISORDER SCREEN: ICD-10-CM

## 2019-09-05 PROBLEM — J40 BRONCHITIS: Status: RESOLVED | Noted: 2018-06-27 | Resolved: 2019-09-05

## 2019-09-05 PROCEDURE — 99395 PREV VISIT EST AGE 18-39: CPT | Performed by: NURSE PRACTITIONER

## 2019-09-05 ASSESSMENT — PATIENT HEALTH QUESTIONNAIRE - PHQ9: CLINICAL INTERPRETATION OF PHQ2 SCORE: 0

## 2019-09-05 NOTE — PROGRESS NOTES
"Chief Complaint   Patient presents with   • Annual Exam     José Manuel Damon is a 18 y.o. female established patient here for annual.  She continues attending school at [employer], she is doing student teaching, also was working at a restaurant.  Living with roommates.  Doing well on an oral contraceptive, not currently in a relationship or sexually active.  She is requesting basic labs.  Mother does have thyroid disease as well as familial hypercholesteremia.  She is currently taking a multivitamin, exercising regularly    Current medicines (including changes today)  Current Outpatient Medications   Medication Sig Dispense Refill   • BLISOVI FE  1-20 MG-MCG per tablet Take  by mouth every day.       No current facility-administered medications for this visit.      She  has a past medical history of Allergy and Anxiety.  She  has a past surgical history that includes tonsillectomy and adenoidectomy and tonsillectomy.  Social History     Tobacco Use   • Smoking status: Former Smoker   • Smokeless tobacco: Never Used   Substance Use Topics   • Alcohol use: Yes     Alcohol/week: 0.6 oz     Types: 1 Cans of beer per week   • Drug use: Yes     Types: Marijuana     Social History     Social History Narrative   • Not on file     Family History   Problem Relation Age of Onset   • Hypertension Mother    • Hyperlipidemia Mother    • Psychiatric Illness Father    • Hypertension Maternal Grandmother    • Diabetes Maternal Grandmother    • Heart Disease Maternal Grandfather      Family Status   Relation Name Status   • Mo  Alive   • Fa  Alive   • Sis  Alive   • MGMo  Alive   • MGFa     • PGMo  Alive         ROS  Problems listed discussed above, all other systems reviewed and negative     Objective:     /60 (BP Location: Left arm, Patient Position: Sitting, BP Cuff Size: Adult)   Pulse 62   Temp 36.8 °C (98.3 °F) (Temporal)   Resp 16   Ht 1.575 m (5' 2\")   Wt 58.5 kg (129 lb)   SpO2 98%  Body mass index is " 23.59 kg/m².  Physical Exam:  General: Alert, oriented in no acute distress.  Eye contact is good, speech is normal, affect calm  HEENT: Oral mucosa pink moist, no lesions. Nares patent. TMs gray with good landmarks bilaterally. No cervical or supraclavicular lymphadenopathy, thyroid isthmus palpable without masses or nodules.  Lungs: clear to auscultation bilaterally, good aeration, normal effort. No wheeze/ rhonchi/ rales.  CV: regular rate and rhythm, S1, S2. No murmur, no JVD, no edema.  Pedal pulses 2 + bilaterally  Abdomen: soft, nontender, BS x4, no hepatosplenomegaly.  Ext: color normal, vascularity normal, temperature normal. No rash or lesions.  Neuro: DTR 2+ bilaterally   Assessment and Plan:   The following treatment plan was discussed   1. Annual physical exam  Normal physical exam. General health and wellness discussion including healthy diet, regular exercise.  Currently taking multivitamin, will evaluate for additional D requirement.  Advised regular dental cleanings, eye exam yearly.  VITAMIN D,25 HYDROXY    TSH WITH REFLEX TO FT4    Comp Metabolic Panel    Lipid Profile    CBC WITH DIFFERENTIAL   2. Encounter for vitamin deficiency screening  VITAMIN D,25 HYDROXY   3. Thyroid disorder screen  TSH WITH REFLEX TO FT4   4. Family history of thyroid disease  TSH WITH REFLEX TO FT4   5. Lipid screening  Lipid Profile   6. Screening for deficiency anemia  CBC WITH DIFFERENTIAL       Followup: pending labs              Please note that this dictation was created using voice recognition software. I have worked with consultants from the vendor as well as technical experts from [facility] [employer] to optimize the interface. I have made every reasonable attempt to correct obvious errors, but I expect that there are errors of grammar and possibly content that I did not discover before finalizing the note.

## 2019-09-10 ENCOUNTER — PATIENT MESSAGE (OUTPATIENT)
Dept: MEDICAL GROUP | Facility: MEDICAL CENTER | Age: 19
End: 2019-09-10

## 2019-09-10 DIAGNOSIS — R73.01 ELEVATED FASTING GLUCOSE: ICD-10-CM

## 2019-09-10 LAB
25(OH)D3+25(OH)D2 SERPL-MCNC: 75.2 NG/ML (ref 30–100)
ALBUMIN SERPL-MCNC: 4.6 G/DL (ref 3.5–5.5)
ALBUMIN/GLOB SERPL: 2.6 {RATIO} (ref 1.2–2.2)
ALP SERPL-CCNC: 45 IU/L (ref 39–117)
ALT SERPL-CCNC: 14 IU/L (ref 0–32)
AST SERPL-CCNC: 18 IU/L (ref 0–40)
BASOPHILS # BLD AUTO: 0 X10E3/UL (ref 0–0.2)
BASOPHILS NFR BLD AUTO: 1 %
BILIRUB SERPL-MCNC: 0.4 MG/DL (ref 0–1.2)
BUN SERPL-MCNC: 15 MG/DL (ref 6–20)
BUN/CREAT SERPL: 12 (ref 9–23)
CALCIUM SERPL-MCNC: 9.7 MG/DL (ref 8.7–10.2)
CHLORIDE SERPL-SCNC: 103 MMOL/L (ref 96–106)
CHOLEST SERPL-MCNC: 189 MG/DL (ref 100–199)
CO2 SERPL-SCNC: 17 MMOL/L (ref 20–29)
CREAT SERPL-MCNC: 1.26 MG/DL (ref 0.57–1)
EOSINOPHIL # BLD AUTO: 0.3 X10E3/UL (ref 0–0.4)
EOSINOPHIL NFR BLD AUTO: 6 %
ERYTHROCYTE [DISTWIDTH] IN BLOOD BY AUTOMATED COUNT: 12.8 % (ref 12.3–15.4)
GLOBULIN SER CALC-MCNC: 1.8 G/DL (ref 1.5–4.5)
GLUCOSE SERPL-MCNC: 116 MG/DL (ref 65–99)
HCT VFR BLD AUTO: 39.6 % (ref 34–46.6)
HDLC SERPL-MCNC: 119 MG/DL
HGB BLD-MCNC: 13.6 G/DL (ref 11.1–15.9)
IMM GRANULOCYTES # BLD AUTO: 0 X10E3/UL (ref 0–0.1)
IMM GRANULOCYTES NFR BLD AUTO: 1 %
IMMATURE CELLS  115398: NORMAL
LABORATORY COMMENT REPORT: NORMAL
LDLC SERPL CALC-MCNC: 62 MG/DL (ref 0–99)
LYMPHOCYTES # BLD AUTO: 1.8 X10E3/UL (ref 0.7–3.1)
LYMPHOCYTES NFR BLD AUTO: 43 %
MCH RBC QN AUTO: 28.7 PG (ref 26.6–33)
MCHC RBC AUTO-ENTMCNC: 34.3 G/DL (ref 31.5–35.7)
MCV RBC AUTO: 84 FL (ref 79–97)
MONOCYTES # BLD AUTO: 0.2 X10E3/UL (ref 0.1–0.9)
MONOCYTES NFR BLD AUTO: 5 %
MORPHOLOGY BLD-IMP: NORMAL
NEUTROPHILS # BLD AUTO: 1.8 X10E3/UL (ref 1.4–7)
NEUTROPHILS NFR BLD AUTO: 44 %
NRBC BLD AUTO-RTO: NORMAL %
PLATELET # BLD AUTO: 272 X10E3/UL (ref 150–450)
POTASSIUM SERPL-SCNC: 3.8 MMOL/L (ref 3.5–5.2)
PROT SERPL-MCNC: 6.4 G/DL (ref 6–8.5)
RBC # BLD AUTO: 4.74 X10E6/UL (ref 3.77–5.28)
SODIUM SERPL-SCNC: 141 MMOL/L (ref 134–144)
TRIGL SERPL-MCNC: 42 MG/DL (ref 0–149)
TSH SERPL DL<=0.005 MIU/L-ACNC: 2.16 UIU/ML (ref 0.45–4.5)
VLDLC SERPL CALC-MCNC: 8 MG/DL (ref 5–40)
WBC # BLD AUTO: 4.2 X10E3/UL (ref 3.4–10.8)

## 2019-09-10 NOTE — TELEPHONE ENCOUNTER
From: José Manuel Damon  To: HILARIA Medina  Sent: 9/10/2019 12:47 PM PDT  Subject: Non-Urgent Medical Question    I have a question about Comp Metabolic Panel resulted on 9/10/19, 11:05 AM.    I did not eat or drink anything after dinner the night before. However, with my dinner I had a cookie if that could have affected it..? The following morning I just drank a lot of water during my workout and went right to the lab. What can I do to lower this?

## 2019-09-10 NOTE — TELEPHONE ENCOUNTER
From: José Manuel Damon  To: HILARIA Medina  Sent: 9/10/2019 3:56 PM PDT  Subject: Non-Urgent Medical Question    I went to [facility] on [facility] [place]. I can go again Thursday morning.     ----- Message -----  From: HILARIA Medina  Sent: 9/10/19, 3:39 PM  To: José Manuel Damon  Subject: RE: Non-Urgent Medical Question    It would be pretty unusual for you to have an elevated glucose, even with eating a cookie the night before. I would like to have this retested and add an additional test called an A1c, this gives us a bigger picture of what is happening with sugar in your body. Would you like to  additional paperwork for lab orders, or we can try faxing them to your lab if you let me know the location.    ----- Message -----   From: José Manuel Damon   Sent: 9/10/2019 12:47 PM PDT   To: HILARIA Medina  Subject: Non-Urgent Medical Question    I have a question about Comp Metabolic Panel resulted on 9/10/19, 11:05 AM.    I did not eat or drink anything after dinner the night before. However, with my dinner I had a cookie if that could have affected it..? The following morning I just drank a lot of water during my workout and went right to the lab. What can I do to lower this?

## 2019-09-12 ENCOUNTER — PATIENT MESSAGE (OUTPATIENT)
Dept: MEDICAL GROUP | Facility: MEDICAL CENTER | Age: 19
End: 2019-09-12

## 2019-09-12 NOTE — TELEPHONE ENCOUNTER
From: José Manuel Damon  To: HILARIA Medina  Sent: 9/12/2019 7:47 AM PDT  Subject: Non-Urgent Medical Question    Just finished up my second lab. Please let me know when the results come in. I'm a little worried with my family background having high glucose levels.     Thank you,  José Manuel Damon   ----- Message -----  From: HILARIA Medina  Sent: 9/10/19, 4:10 PM  To: José Manuel Damon  Subject: RE: Non-Urgent Medical Question    Perfect! I'll send them paperwork, please fast again (water is fine).     ----- Message -----   From: José Manuel Karlykyler Damon   Sent: 9/10/2019 3:56 PM PDT   To: HILARIA Medina  Subject: Non-Urgent Medical Question    I went to [facility] on [facility]. I can go again Thursday morning.     ----- Message -----  From: HILARIA Medina  Sent: 9/10/19, 3:39 PM  To: José Manuel Karlykyler Damon  Subject: RE: Non-Urgent Medical Question    It would be pretty unusual for you to have an elevated glucose, even with eating a cookie the night before. I would like to have this retested and add an additional test called an A1c, this gives us a bigger picture of what is happening with sugar in your body. Would you like to  additional paperwork for lab orders, or we can try faxing them to your lab if you let me know the location.    ----- Message -----   From: José Manuel Damon   Sent: 9/10/2019 12:47 PM PDT   To: HILARIA Medina  Subject: Non-Urgent Medical Question    I have a question about Comp Metabolic Panel resulted on 9/10/19, 11:05 AM.    I did not eat or drink anything after dinner the night before. However, with my dinner I had a cookie if that could have affected it..? The following morning I just drank a lot of water during my workout and went right to the lab. What can I do to lower this?

## 2019-09-13 LAB
ALBUMIN SERPL-MCNC: 4.4 G/DL (ref 3.5–5.5)
ALBUMIN/GLOB SERPL: 2.3 {RATIO} (ref 1.2–2.2)
ALP SERPL-CCNC: 41 IU/L (ref 39–117)
ALT SERPL-CCNC: 13 IU/L (ref 0–32)
AST SERPL-CCNC: 16 IU/L (ref 0–40)
BILIRUB SERPL-MCNC: 0.5 MG/DL (ref 0–1.2)
BUN SERPL-MCNC: 13 MG/DL (ref 6–20)
BUN/CREAT SERPL: 12 (ref 9–23)
CALCIUM SERPL-MCNC: 9.4 MG/DL (ref 8.7–10.2)
CHLORIDE SERPL-SCNC: 105 MMOL/L (ref 96–106)
CO2 SERPL-SCNC: 20 MMOL/L (ref 20–29)
CREAT SERPL-MCNC: 1.05 MG/DL (ref 0.57–1)
GLOBULIN SER CALC-MCNC: 1.9 G/DL (ref 1.5–4.5)
GLUCOSE SERPL-MCNC: 84 MG/DL (ref 65–99)
HBA1C MFR BLD: 5.2 % (ref 4.8–5.6)
POTASSIUM SERPL-SCNC: 4.2 MMOL/L (ref 3.5–5.2)
PROT SERPL-MCNC: 6.3 G/DL (ref 6–8.5)
SODIUM SERPL-SCNC: 141 MMOL/L (ref 134–144)

## 2020-01-02 ENCOUNTER — OFFICE VISIT (OUTPATIENT)
Dept: URGENT CARE | Facility: CLINIC | Age: 20
End: 2020-01-02
Payer: COMMERCIAL

## 2020-01-02 VITALS
TEMPERATURE: 98 F | WEIGHT: 133.2 LBS | OXYGEN SATURATION: 97 % | BODY MASS INDEX: 24.51 KG/M2 | HEART RATE: 59 BPM | RESPIRATION RATE: 12 BRPM | HEIGHT: 62 IN | SYSTOLIC BLOOD PRESSURE: 108 MMHG | DIASTOLIC BLOOD PRESSURE: 62 MMHG

## 2020-01-02 DIAGNOSIS — H66.002 NON-RECURRENT ACUTE SUPPURATIVE OTITIS MEDIA OF LEFT EAR WITHOUT SPONTANEOUS RUPTURE OF TYMPANIC MEMBRANE: ICD-10-CM

## 2020-01-02 PROCEDURE — 99214 OFFICE O/P EST MOD 30 MIN: CPT | Performed by: PHYSICIAN ASSISTANT

## 2020-01-02 RX ORDER — AMOXICILLIN 500 MG/1
500 CAPSULE ORAL 2 TIMES DAILY
Qty: 20 CAP | Refills: 0 | Status: SHIPPED | OUTPATIENT
Start: 2020-01-02 | End: 2020-01-02

## 2020-01-02 RX ORDER — AMOXICILLIN 500 MG/1
500 CAPSULE ORAL 3 TIMES DAILY
Qty: 30 CAP | Refills: 0 | Status: SHIPPED | OUTPATIENT
Start: 2020-01-02 | End: 2022-03-03

## 2020-01-02 ASSESSMENT — ENCOUNTER SYMPTOMS
HEADACHES: 0
ABDOMINAL PAIN: 0
RHINORRHEA: 0
VOMITING: 0
NAUSEA: 0
SORE THROAT: 0
DIARRHEA: 0
COUGH: 0
CHILLS: 0
FEVER: 0

## 2020-01-03 NOTE — PROGRESS NOTES
"Subjective:      José Manuel Damon is a 19 y.o. female who presents with Nasal Congestion (x 10 days.  Pt. complains of nasal congestion and blilateral plugged ears.  Pt. denies any cough, fever or chills. )          Otalgia    There is pain in the left ear. This is a new problem. The current episode started yesterday. The problem occurs constantly. The problem has been unchanged. There has been no fever. The pain is mild. Pertinent negatives include no abdominal pain, coughing, diarrhea, ear discharge, headaches, rash, rhinorrhea, sore throat or vomiting. She has tried nothing for the symptoms. History of ear infections at least once per year   Described as a ear fullness.     Review of Systems   Constitutional: Negative for chills and fever.   HENT: Positive for ear pain. Negative for ear discharge, rhinorrhea and sore throat.    Respiratory: Negative for cough.    Cardiovascular: Negative for chest pain.   Gastrointestinal: Negative for abdominal pain, diarrhea, nausea and vomiting.   Skin: Negative for rash.   Neurological: Negative for headaches.          Objective:     /62   Pulse (!) 59   Temp 36.7 °C (98 °F) (Temporal)   Resp 12   Ht 1.575 m (5' 2\")   Wt 60.4 kg (133 lb 3.2 oz)   SpO2 97%   BMI 24.36 kg/m²      Physical Exam  Vitals signs reviewed.   Constitutional:       Appearance: Normal appearance.   HENT:      Right Ear: Tympanic membrane is erythematous.      Left Ear: Tympanic membrane is injected and erythematous.      Mouth/Throat:      Pharynx: Oropharynx is clear.   Eyes:      Pupils: Pupils are equal, round, and reactive to light.   Neck:      Musculoskeletal: Neck supple.   Cardiovascular:      Rate and Rhythm: Normal rate and regular rhythm.      Heart sounds: Normal heart sounds.   Pulmonary:      Breath sounds: Normal breath sounds.   Neurological:      Mental Status: She is alert and oriented to person, place, and time.   Psychiatric:         Mood and Affect: Mood normal.    "      Behavior: Behavior normal.       Past Medical History:   Diagnosis Date   • Allergy    • Anxiety       Past Surgical History:   Procedure Laterality Date   • TONSILLECTOMY     • TONSILLECTOMY AND ADENOIDECTOMY        Social History     Socioeconomic History   • Marital status: Single     Spouse name: Not on file   • Number of children: Not on file   • Years of education: Not on file   • Highest education level: Not on file   Occupational History   • Not on file   Social Needs   • Financial resource strain: Not on file   • Food insecurity:     Worry: Not on file     Inability: Not on file   • Transportation needs:     Medical: Not on file     Non-medical: Not on file   Tobacco Use   • Smoking status: Former Smoker   • Smokeless tobacco: Never Used   Substance and Sexual Activity   • Alcohol use: Yes     Alcohol/week: 0.6 oz     Types: 1 Cans of beer per week   • Drug use: Yes     Types: Marijuana   • Sexual activity: Yes     Birth control/protection: Pill   Lifestyle   • Physical activity:     Days per week: Not on file     Minutes per session: Not on file   • Stress: Not on file   Relationships   • Social connections:     Talks on phone: Not on file     Gets together: Not on file     Attends Restorationist service: Not on file     Active member of club or organization: Not on file     Attends meetings of clubs or organizations: Not on file     Relationship status: Not on file   • Intimate partner violence:     Fear of current or ex partner: Not on file     Emotionally abused: Not on file     Physically abused: Not on file     Forced sexual activity: Not on file   Other Topics Concern   • Behavioral problems Not Asked   • Interpersonal relationships Not Asked   • Sad or not enjoying activities Not Asked   • Suicidal thoughts Not Asked   • Poor school performance Not Asked   • Reading difficulties Not Asked   • Speech difficulties Not Asked   • Writing difficulties Not Asked   • Inadequate sleep Not Asked   •  Excessive TV viewing Not Asked   • Excessive video game use Not Asked   • Inadequate exercise Not Asked   • Sports related Not Asked   • Poor diet Not Asked   • Family concerns for drug/alcohol abuse Not Asked   • Poor oral hygiene Not Asked   • Bike safety Not Asked   • Family concerns vehicle safety Not Asked   Social History Narrative   • Not on file    Carmel [drospirenone-ethinyl estradiol]        Assessment/Plan:       1. Non-recurrent acute suppurative otitis media of left ear without spontaneous rupture of tympanic membrane    - amoxicillin (AMOXIL) 500 MG Cap; Take 1 Cap by mouth 3 times a day.  Dispense: 30 Cap; Refill: 0    Discussed with patient signs consistent with left otitis media, uncomplicated.  Will treat with amoxicillin.  She has no other complaints, no fevers or chills.    Return to the clinic if any fevers chills or worsening conditions.  Patient understood and agreed to plan of care.    Please note that this dictation was created using voice recognition software. I have made every reasonable attempt to correct obvious errors, but I expect that there are errors of grammar and possibly content that I did not discover before finalizing the note.

## 2020-09-09 ENCOUNTER — PATIENT MESSAGE (OUTPATIENT)
Dept: MEDICAL GROUP | Facility: MEDICAL CENTER | Age: 20
End: 2020-09-09

## 2020-09-09 DIAGNOSIS — Z83.49 FAMILY HISTORY OF THYROID DISORDER: ICD-10-CM

## 2020-09-09 DIAGNOSIS — Z80.8 FAMILY HISTORY OF THYROID CANCER: ICD-10-CM

## 2020-09-09 DIAGNOSIS — Z13.29 THYROID DISORDER SCREEN: ICD-10-CM

## 2020-09-10 NOTE — TELEPHONE ENCOUNTER
From: José Manuel Damon  To: HILARIA Medina  Sent: 9/9/2020 4:57 PM PDT  Subject: Non-Urgent Medical Question    Okay great thank you. Is that at the same location of your office?       ----- Message -----   From:HILARIA Medina   Sent:9/9/2020 4:28 PM PDT   To:José Manuel Damon   Subject:RE: Non-Urgent Medical Question      Josue Martínez,  I can order this test for you.  You can go into the [facility] anytime.  Macrina BENTLEY      ----- Message -----   From:José Manuel Damon   Sent:9/9/2020 2:55 PM PDT   To:HILARIA Medina   Subject:Non-Urgent Medical Question    Josue Schrader,  I went to the eye doctor today and he recommended getting my thyroid checked out because of my mothers health. Specifically, the panels he wrote down in the image attached. Where can I go from here to test these?

## 2020-09-11 ENCOUNTER — HOSPITAL ENCOUNTER (OUTPATIENT)
Dept: LAB | Facility: MEDICAL CENTER | Age: 20
End: 2020-09-11
Attending: NURSE PRACTITIONER
Payer: COMMERCIAL

## 2020-09-11 DIAGNOSIS — Z83.49 FAMILY HISTORY OF THYROID DISORDER: ICD-10-CM

## 2020-09-11 DIAGNOSIS — Z13.29 THYROID DISORDER SCREEN: ICD-10-CM

## 2020-09-11 LAB
ALBUMIN SERPL BCP-MCNC: 4.4 G/DL (ref 3.2–4.9)
ALBUMIN/GLOB SERPL: 2 G/DL
ALP SERPL-CCNC: 57 U/L (ref 30–99)
ALT SERPL-CCNC: 13 U/L (ref 2–50)
ANION GAP SERPL CALC-SCNC: 12 MMOL/L (ref 7–16)
AST SERPL-CCNC: 16 U/L (ref 12–45)
BILIRUB SERPL-MCNC: 0.4 MG/DL (ref 0.1–1.5)
BUN SERPL-MCNC: 13 MG/DL (ref 8–22)
CALCIUM SERPL-MCNC: 9.8 MG/DL (ref 8.4–10.2)
CHLORIDE SERPL-SCNC: 106 MMOL/L (ref 96–112)
CO2 SERPL-SCNC: 26 MMOL/L (ref 20–33)
CREAT SERPL-MCNC: 0.84 MG/DL (ref 0.5–1.4)
EST. AVERAGE GLUCOSE BLD GHB EST-MCNC: 114 MG/DL
GLOBULIN SER CALC-MCNC: 2.2 G/DL (ref 1.9–3.5)
GLUCOSE SERPL-MCNC: 106 MG/DL (ref 65–99)
HBA1C MFR BLD: 5.6 % (ref 0–5.6)
POTASSIUM SERPL-SCNC: 4.4 MMOL/L (ref 3.6–5.5)
PROT SERPL-MCNC: 6.6 G/DL (ref 6–8.2)
SODIUM SERPL-SCNC: 144 MMOL/L (ref 135–145)
T3 SERPL-MCNC: 100 NG/DL (ref 60–181)
T3FREE SERPL-MCNC: 2.92 PG/ML (ref 2–4.4)
T4 FREE SERPL-MCNC: 1.36 NG/DL (ref 0.93–1.7)
THYROPEROXIDASE AB SERPL-ACNC: <9 IU/ML (ref 0–9)
TSH SERPL DL<=0.005 MIU/L-ACNC: 1.2 UIU/ML (ref 0.38–5.33)

## 2020-09-11 PROCEDURE — 36415 COLL VENOUS BLD VENIPUNCTURE: CPT

## 2020-09-11 PROCEDURE — 84481 FREE ASSAY (FT-3): CPT

## 2020-09-11 PROCEDURE — 80053 COMPREHEN METABOLIC PANEL: CPT

## 2020-09-11 PROCEDURE — 83036 HEMOGLOBIN GLYCOSYLATED A1C: CPT

## 2020-09-11 PROCEDURE — 86376 MICROSOMAL ANTIBODY EACH: CPT

## 2020-09-11 PROCEDURE — 84439 ASSAY OF FREE THYROXINE: CPT

## 2020-09-11 PROCEDURE — 84443 ASSAY THYROID STIM HORMONE: CPT

## 2020-09-11 PROCEDURE — 84480 ASSAY TRIIODOTHYRONINE (T3): CPT

## 2020-12-01 ENCOUNTER — PATIENT MESSAGE (OUTPATIENT)
Dept: MEDICAL GROUP | Facility: MEDICAL CENTER | Age: 20
End: 2020-12-01

## 2020-12-02 NOTE — TELEPHONE ENCOUNTER
From: José Manuel Damon  To: HILARIA Medina  Sent: 12/1/2020 5:24 PM PST  Subject: Non-Urgent Medical Question    Josue Schrader. I just found out that I have been exposed to Covid 19. Can you please send in a medical lab order for me to get tested? I would really appreciate it!   Thanks,  José Manuel Damon

## 2021-04-19 ENCOUNTER — PATIENT MESSAGE (OUTPATIENT)
Dept: MEDICAL GROUP | Facility: MEDICAL CENTER | Age: 21
End: 2021-04-19

## 2021-04-19 RX ORDER — MONTELUKAST SODIUM 10 MG/1
10 TABLET ORAL DAILY
Qty: 30 TABLET | Refills: 5 | Status: SHIPPED | OUTPATIENT
Start: 2021-04-19 | End: 2022-03-03

## 2021-09-06 ENCOUNTER — PATIENT MESSAGE (OUTPATIENT)
Dept: MEDICAL GROUP | Facility: MEDICAL CENTER | Age: 21
End: 2021-09-06

## 2021-09-07 NOTE — TELEPHONE ENCOUNTER
From: José Manuel Damon  To: Nurse Practitioner Virgen Elias  Sent: 9/6/2021 12:08 PM PDT  Subject: Schedule appointment    Josue paulino,   I have been having painful bowel movements and I have noticed traces of blood when I wipe. This has been going on for 2 weeks now and I want to make sure there’s nothing wrong. I was hoping to schedule an appointment as soon as possible.

## 2021-09-08 ENCOUNTER — OFFICE VISIT (OUTPATIENT)
Dept: MEDICAL GROUP | Facility: MEDICAL CENTER | Age: 21
End: 2021-09-08
Payer: COMMERCIAL

## 2021-09-08 VITALS
RESPIRATION RATE: 16 BRPM | HEART RATE: 60 BPM | TEMPERATURE: 97.1 F | SYSTOLIC BLOOD PRESSURE: 98 MMHG | WEIGHT: 149.69 LBS | HEIGHT: 60 IN | OXYGEN SATURATION: 99 % | DIASTOLIC BLOOD PRESSURE: 64 MMHG | BODY MASS INDEX: 29.39 KG/M2

## 2021-09-08 DIAGNOSIS — K62.5 BRIGHT RED BLOOD PER RECTUM: ICD-10-CM

## 2021-09-08 PROCEDURE — 99213 OFFICE O/P EST LOW 20 MIN: CPT | Performed by: NURSE PRACTITIONER

## 2021-09-08 RX ORDER — NORETHINDRONE ACETATE AND ETHINYL ESTRADIOL .02; 1 MG/1; MG/1
TABLET ORAL
COMMUNITY
Start: 2021-08-28 | End: 2022-03-03

## 2021-09-08 RX ORDER — HYDROCORTISONE ACETATE 25 MG/1
25 SUPPOSITORY RECTAL EVERY 12 HOURS
Qty: 24 SUPPOSITORY | Refills: 0 | Status: SHIPPED | OUTPATIENT
Start: 2021-09-08 | End: 2022-03-03 | Stop reason: SDUPTHER

## 2021-09-08 ASSESSMENT — PATIENT HEALTH QUESTIONNAIRE - PHQ9: CLINICAL INTERPRETATION OF PHQ2 SCORE: 0

## 2021-09-08 NOTE — PROGRESS NOTES
"Subjective:     Chief Complaint   Patient presents with   • Bloody Stools     x1mo      José Manuel Damon is a 20 y.o. female established patient here with concerns of blood with bowel movement intermittently over the last month.  She notes a small amount of blood when wiping after a bowel movement, not with every stool, but coming and going.  She denies any constipation, diarrhea, rectal pain, abdominal cramping.  No blood visible in the toilet  She has tried topical Preparation H which does seem to help but then it returns a few days later.  She is not felt any external rectal bumps      Current medicines (including changes today)  Current Outpatient Medications   Medication Sig Dispense Refill   • hydrocortisone (ANUSOL-HC) 25 MG Suppos Insert 1 Suppository into the rectum every 12 hours. 24 Suppository 0   • norethindrone-ethinyl estradiol (MICROGESTIN 1/20) 1-20 MG-MCG per tablet      • montelukast (SINGULAIR) 10 MG Tab Take 1 tablet by mouth every day. 30 tablet 5   • amoxicillin (AMOXIL) 500 MG Cap Take 1 Cap by mouth 3 times a day. 30 Cap 0   • BLISOVI FE 1/20 1-20 MG-MCG per tablet Take  by mouth every day.       No current facility-administered medications for this visit.     She  has a past medical history of Allergy and Anxiety.    ROS included above     Objective:     BP (!) 98/64 (BP Location: Right arm, Patient Position: Sitting, BP Cuff Size: Adult)   Pulse 60   Temp 36.2 °C (97.1 °F) (Temporal)   Resp 16   Ht 1.529 m (5' 0.2\")   Wt 67.9 kg (149 lb 11.1 oz)   SpO2 99%  Body mass index is 29.04 kg/m².     Physical Exam:  General: Alert, oriented in no acute distress.  Eye contact is good, speech is normal, affect calm.  Lungs: clear to auscultation bilaterally, normal effort, no wheeze/ rhonchi/ rales.  CV: regular rate and rhythm, S1, S2, no murmur  Ext: no edema, color normal, vascularity normal, temperature normal    Assessment and Plan:   The following treatment plan was discussed  1. " Bright red blood per rectum   small amount of blood with bowel movements intermittently over the last month, no associated pain, abdominal cramping.  Suspect this may be related to an internal hemorrhoid.  Encouraged to start fiber supplement, hydration discussed.  We will try:  hydrocortisone (ANUSOL-HC) 25 MG Suppos  If symptoms do not seem to resolve, or are progressing she will contact me       Followup: As needed         Please note that this dictation was created using voice recognition software. I have worked with consultants from the vendor as well as technical experts from [facility] to optimize the interface. I have made every reasonable attempt to correct obvious errors, but I expect that there are errors of grammar and possibly content that I did not discover before finalizing the note.

## 2021-09-11 ENCOUNTER — PATIENT MESSAGE (OUTPATIENT)
Dept: MEDICAL GROUP | Facility: MEDICAL CENTER | Age: 21
End: 2021-09-11

## 2021-09-11 DIAGNOSIS — J01.00 ACUTE NON-RECURRENT MAXILLARY SINUSITIS: ICD-10-CM

## 2021-09-11 RX ORDER — AMOXICILLIN AND CLAVULANATE POTASSIUM 875; 125 MG/1; MG/1
1 TABLET, FILM COATED ORAL 2 TIMES DAILY
Qty: 20 TABLET | Refills: 0 | Status: SHIPPED | OUTPATIENT
Start: 2021-09-11 | End: 2022-03-03

## 2021-09-11 RX ORDER — FLUTICASONE PROPIONATE 50 MCG
1 SPRAY, SUSPENSION (ML) NASAL 2 TIMES DAILY
Qty: 16 G | Refills: 0 | Status: SHIPPED | OUTPATIENT
Start: 2021-09-11 | End: 2021-09-13

## 2021-09-11 NOTE — TELEPHONE ENCOUNTER
From: José Manuel Damon  To: Nurse Practitioner Virgen Elias  Sent: 9/11/2021 9:32 AM PDT  Subject: Eat aches in [place]    Josue Schrader,  I’m in [place] and am experiencing bad ear aches that are leading into my sinuses, similar to what my mom is experiencing. My mom said it’s a possibility for you to send in the nasal spray and amoxicillin into [place]. Is that possible? There’s a pharmacy within walking distance to where I’m staying:   Missy,   [address] [place] [address]

## 2021-09-13 DIAGNOSIS — J01.00 ACUTE NON-RECURRENT MAXILLARY SINUSITIS: ICD-10-CM

## 2021-09-13 RX ORDER — FLUTICASONE PROPIONATE 50 MCG
SPRAY, SUSPENSION (ML) NASAL
Qty: 48 G | Refills: 0 | Status: SHIPPED | OUTPATIENT
Start: 2021-09-13 | End: 2023-03-03

## 2021-11-18 ENCOUNTER — OFFICE VISIT (OUTPATIENT)
Dept: MEDICAL GROUP | Facility: MEDICAL CENTER | Age: 21
End: 2021-11-18
Payer: COMMERCIAL

## 2021-11-18 VITALS
HEIGHT: 60 IN | DIASTOLIC BLOOD PRESSURE: 62 MMHG | SYSTOLIC BLOOD PRESSURE: 112 MMHG | HEART RATE: 86 BPM | TEMPERATURE: 98 F | OXYGEN SATURATION: 98 % | WEIGHT: 149.25 LBS | RESPIRATION RATE: 18 BRPM | BODY MASS INDEX: 29.3 KG/M2

## 2021-11-18 DIAGNOSIS — M67.431 GANGLION CYST OF WRIST, RIGHT: ICD-10-CM

## 2021-11-18 DIAGNOSIS — Z30.09 FAMILY PLANNING: ICD-10-CM

## 2021-11-18 PROCEDURE — 99213 OFFICE O/P EST LOW 20 MIN: CPT | Performed by: NURSE PRACTITIONER

## 2021-11-18 RX ORDER — ADAPALENE AND BENZOYL PEROXIDE GEL, 0.1%/2.5% 1; 25 MG/G; MG/G
GEL TOPICAL
COMMUNITY
Start: 2021-10-18 | End: 2023-03-03

## 2021-11-18 NOTE — ASSESSMENT & PLAN NOTE
Cyst on the dorsal aspect of the right wrist which has been present for many years.  Now causing her more discomfort.  She is unable to do some the exercises as putting pressure on the wrist is painful.  She feels shooting pain down through the hand at times, also tends to be aggravated with writing.  This is worsened with time, she is not found any specific alleviating treatments  No history of injury, numbness, tingling in the hand distally

## 2021-11-18 NOTE — ASSESSMENT & PLAN NOTE
Consistent with OCP which has been working well for her.  Her menses is usually right on time with placebo.  This last month she did start a few days early and has had light but darker bleeding.  She took a pregnancy test, this was negative.  She is not having any pelvic pain, worsening cramping, vaginal discharge.  No concern for STDs

## 2021-11-18 NOTE — PROGRESS NOTES
Subjective:     Chief Complaint   Patient presents with   • Ganglion Cyst     José Manuel Damon is a 21 y.o. female here today to follow up on:    Family planning  Consistent with OCP which has been working well for her.  Her menses is usually right on time with placebo.  This last month she did start a few days early and has had light but darker bleeding.  She took a pregnancy test, this was negative.  She is not having any pelvic pain, worsening cramping, vaginal discharge.  No concern for STDs    Ganglion cyst of wrist, right  Cyst on the dorsal aspect of the right wrist which has been present for many years.  Now causing her more discomfort.  She is unable to do some the exercises as putting pressure on the wrist is painful.  She feels shooting pain down through the hand at times, also tends to be aggravated with writing.  This is worsened with time, she is not found any specific alleviating treatments  No history of injury, numbness, tingling in the hand distally       Current medicines (including changes today)  Current Outpatient Medications   Medication Sig Dispense Refill   • Adapalene-Benzoyl Peroxide 0.1-2.5 % Gel      • fluticasone (FLONASE) 50 MCG/ACT nasal spray INSTILL 1 SPRAY IN EACH NOSTRIL TWICE DAILY 48 g 0   • amoxicillin-clavulanate (AUGMENTIN) 875-125 MG Tab Take 1 Tablet by mouth 2 times a day. 20 Tablet 0   • norethindrone-ethinyl estradiol (MICROGESTIN 1/20) 1-20 MG-MCG per tablet      • hydrocortisone (ANUSOL-HC) 25 MG Suppos Insert 1 Suppository into the rectum every 12 hours. 24 Suppository 0   • montelukast (SINGULAIR) 10 MG Tab Take 1 tablet by mouth every day. 30 tablet 5   • amoxicillin (AMOXIL) 500 MG Cap Take 1 Cap by mouth 3 times a day. 30 Cap 0   • BLISOVI FE 1/20 1-20 MG-MCG per tablet Take  by mouth every day.       No current facility-administered medications for this visit.     She  has a past medical history of Allergy and Anxiety.    ROS included above     Objective:      /62 (BP Location: Right arm, Patient Position: Sitting, BP Cuff Size: Adult)   Pulse 86   Temp 36.7 °C (98 °F) (Temporal)   Resp 18   Ht 1.524 m (5')   Wt 67.7 kg (149 lb 4 oz)   SpO2 98%  Body mass index is 29.15 kg/m².     Physical Exam:  General: Alert, oriented in no acute distress.  Eye contact is good, speech is normal, affect calm  Lungs: clear to auscultation bilaterally, normal effort, no wheeze/ rhonchi/ rales.  CV: regular rate and rhythm, S1, S2, no murmur  MS: Right wrist with approximately 4 cm ganglion cyst on the dorsal surface, mildly tender.  No overlying erythema.  Full range of motion  Ext: no edema, color normal, vascularity normal, temperature normal    Assessment and Plan:   The following treatment plan was discussed   1. Ganglion cyst of wrist, right   ganglion cyst on the dorsal wrist causing difficulty with exercise and pain radiating to the hand.  Will refer to orthopedics for evaluation  Referral to Orthopedics   2. Family planning   consistent with oral contraceptive.  Recent menses starting a few days before her placebo, this is the first time she is experienced this.  No associated pain or symptoms of infection.  We will monitor for now, may need change of oral contraceptive if continuing.  She plans to establish with a new OB/GYN.  Up-to-date on Pap smear       Followup: As needed         Please note that this dictation was created using voice recognition software. I have worked with consultants from the vendor as well as technical experts from [facility] [employer]" to optimize the interface. I have made every reasonable attempt to correct obvious errors, but I expect that there are errors of grammar and possibly content that I did not discover before finalizing the note.

## 2021-12-07 ENCOUNTER — APPOINTMENT (OUTPATIENT)
Dept: URGENT CARE | Facility: CLINIC | Age: 21
End: 2021-12-07
Payer: COMMERCIAL

## 2021-12-08 ENCOUNTER — OFFICE VISIT (OUTPATIENT)
Dept: MEDICAL GROUP | Facility: MEDICAL CENTER | Age: 21
End: 2021-12-08
Payer: COMMERCIAL

## 2021-12-08 VITALS
DIASTOLIC BLOOD PRESSURE: 62 MMHG | RESPIRATION RATE: 16 BRPM | HEART RATE: 63 BPM | WEIGHT: 138.12 LBS | HEIGHT: 60 IN | BODY MASS INDEX: 27.12 KG/M2 | SYSTOLIC BLOOD PRESSURE: 108 MMHG | OXYGEN SATURATION: 97 % | TEMPERATURE: 98.3 F

## 2021-12-08 DIAGNOSIS — R05.9 COUGH: ICD-10-CM

## 2021-12-08 PROBLEM — M67.431 GANGLION CYST OF DORSUM OF RIGHT WRIST: Status: ACTIVE | Noted: 2021-12-08

## 2021-12-08 PROCEDURE — 99213 OFFICE O/P EST LOW 20 MIN: CPT | Performed by: NURSE PRACTITIONER

## 2021-12-08 RX ORDER — BENZONATATE 100 MG/1
100 CAPSULE ORAL 3 TIMES DAILY PRN
Qty: 60 CAPSULE | Refills: 0 | Status: SHIPPED | OUTPATIENT
Start: 2021-12-08 | End: 2022-03-03

## 2021-12-08 RX ORDER — CODEINE PHOSPHATE AND GUAIFENESIN 10; 100 MG/5ML; MG/5ML
5-10 SOLUTION ORAL EVERY 6 HOURS PRN
Qty: 180 ML | Refills: 0 | Status: SHIPPED | OUTPATIENT
Start: 2021-12-08 | End: 2021-12-15

## 2021-12-08 NOTE — ASSESSMENT & PLAN NOTE
She reports initial illness starting approximately 2 weeks ago with cough, congestion, irritated or scratchy throat.  Symptoms have been fairly mild throughout, she denies having had any fever, chills, malaise, headache.  She is concerned that this cough is still lingering on.  She did have a Covid test which is negative.  She is fully vaccinated including booster  She works as a   Appetite and energy have been fine.  Cough is mostly problematic at night or in work settings when she is trying not to cough in public

## 2021-12-08 NOTE — PROGRESS NOTES
Subjective:     Chief Complaint   Patient presents with   • Cough     x17 days   • Runny Nose     x17 days     José Manuel Damon is a 21 y.o. female here today to follow up on:    Cough  She reports initial illness starting approximately 2 weeks ago with cough, congestion, irritated or scratchy throat.  Symptoms have been fairly mild throughout, she denies having had any fever, chills, malaise, headache.  She is concerned that this cough is still lingering on.  She did have a Covid test which is negative.  She is fully vaccinated including booster  She works as a   Appetite and energy have been fine.  Cough is mostly problematic at night or in work settings when she is trying not to cough in public       Current medicines (including changes today)  Current Outpatient Medications   Medication Sig Dispense Refill   • benzonatate (TESSALON) 100 MG Cap Take 1 Capsule by mouth 3 times a day as needed for Cough. 60 Capsule 0   • guaifenesin-codeine (ROBITUSSIN AC) Solution oral solution Take 5-10 mL by mouth every 6 hours as needed for Cough for up to 7 days. 180 mL 0   • Adapalene-Benzoyl Peroxide 0.1-2.5 % Gel      • fluticasone (FLONASE) 50 MCG/ACT nasal spray INSTILL 1 SPRAY IN EACH NOSTRIL TWICE DAILY 48 g 0   • amoxicillin-clavulanate (AUGMENTIN) 875-125 MG Tab Take 1 Tablet by mouth 2 times a day. 20 Tablet 0   • norethindrone-ethinyl estradiol (MICROGESTIN 1/20) 1-20 MG-MCG per tablet      • hydrocortisone (ANUSOL-HC) 25 MG Suppos Insert 1 Suppository into the rectum every 12 hours. 24 Suppository 0   • montelukast (SINGULAIR) 10 MG Tab Take 1 tablet by mouth every day. 30 tablet 5   • amoxicillin (AMOXIL) 500 MG Cap Take 1 Cap by mouth 3 times a day. 30 Cap 0   • BLISOVI FE 1/20 1-20 MG-MCG per tablet Take  by mouth every day.       No current facility-administered medications for this visit.     She  has a past medical history of Allergy and Anxiety.    ROS included above      Objective:     /62 (BP Location: Left arm, Patient Position: Sitting, BP Cuff Size: Adult)   Pulse 63   Temp 36.8 °C (98.3 °F) (Temporal)   Resp 16   Ht 1.524 m (5')   Wt 62.6 kg (138 lb 1.9 oz)   SpO2 97%  Body mass index is 26.97 kg/m².     Physical Exam:  General: Alert, oriented in no acute distress.  Eye contact is good, speech is normal, affect calm  HEENT:  TMs gray with good landmarks bilaterally.  No maxillary or frontal sinus tenderness.  No tonsillar or cervical   Lungs: clear to auscultation bilaterally, normal effort, no wheeze/ rhonchi/ rales.  CV: regular rate and rhythm, S1, S2, no murmur  Ext: no edema, color normal, vascularity normal, temperature normal    Assessment and Plan:   The following treatment plan was discussed   1. Cough   cough ongoing now for more than 2 weeks, dry and nonproductive, clear nasal mucus.  Lungs are clear on exam, well oxygenated on room air, no acute distress.  Energy and appetite have been normal.  She did have a Covid test which was negative.  Expect this was another viral illness and cough may continue for another few days.  Will address with benzonatate, may use prescription cough syrup in the evening.  Advised not to drive or drink alcohol with medication.   report reviewed, I have determined this prescription to be medically necessary.  She will follow-up if not gradually resolving  benzonatate (TESSALON) 100 MG Cap    guaifenesin-codeine (ROBITUSSIN AC) Solution oral solution       Followup: As needed         Please note that this dictation was created using voice recognition software. I have worked with consultants from the vendor as well as technical experts from [facility] to optimize the interface. I have made every reasonable attempt to correct obvious errors, but I expect that there are errors of grammar and possibly content that I did not discover before finalizing the note.

## 2022-03-01 SDOH — ECONOMIC STABILITY: HOUSING INSECURITY
IN THE LAST 12 MONTHS, WAS THERE A TIME WHEN YOU DID NOT HAVE A STEADY PLACE TO SLEEP OR SLEPT IN A SHELTER (INCLUDING NOW)?: NO

## 2022-03-01 SDOH — ECONOMIC STABILITY: INCOME INSECURITY: IN THE LAST 12 MONTHS, WAS THERE A TIME WHEN YOU WERE NOT ABLE TO PAY THE MORTGAGE OR RENT ON TIME?: NO

## 2022-03-01 SDOH — ECONOMIC STABILITY: INCOME INSECURITY: HOW HARD IS IT FOR YOU TO PAY FOR THE VERY BASICS LIKE FOOD, HOUSING, MEDICAL CARE, AND HEATING?: NOT VERY HARD

## 2022-03-01 SDOH — ECONOMIC STABILITY: TRANSPORTATION INSECURITY
IN THE PAST 12 MONTHS, HAS LACK OF TRANSPORTATION KEPT YOU FROM MEETINGS, WORK, OR FROM GETTING THINGS NEEDED FOR DAILY LIVING?: NO

## 2022-03-01 SDOH — ECONOMIC STABILITY: HOUSING INSECURITY: IN THE LAST 12 MONTHS, HOW MANY PLACES HAVE YOU LIVED?: 2

## 2022-03-01 SDOH — ECONOMIC STABILITY: FOOD INSECURITY: WITHIN THE PAST 12 MONTHS, YOU WORRIED THAT YOUR FOOD WOULD RUN OUT BEFORE YOU GOT MONEY TO BUY MORE.: NEVER TRUE

## 2022-03-01 SDOH — ECONOMIC STABILITY: TRANSPORTATION INSECURITY
IN THE PAST 12 MONTHS, HAS THE LACK OF TRANSPORTATION KEPT YOU FROM MEDICAL APPOINTMENTS OR FROM GETTING MEDICATIONS?: NO

## 2022-03-01 SDOH — ECONOMIC STABILITY: FOOD INSECURITY: WITHIN THE PAST 12 MONTHS, THE FOOD YOU BOUGHT JUST DIDN'T LAST AND YOU DIDN'T HAVE MONEY TO GET MORE.: NEVER TRUE

## 2022-03-01 SDOH — HEALTH STABILITY: PHYSICAL HEALTH: ON AVERAGE, HOW MANY DAYS PER WEEK DO YOU ENGAGE IN MODERATE TO STRENUOUS EXERCISE (LIKE A BRISK WALK)?: 6 DAYS

## 2022-03-01 SDOH — HEALTH STABILITY: PHYSICAL HEALTH: ON AVERAGE, HOW MANY MINUTES DO YOU ENGAGE IN EXERCISE AT THIS LEVEL?: 60 MIN

## 2022-03-01 SDOH — HEALTH STABILITY: MENTAL HEALTH
STRESS IS WHEN SOMEONE FEELS TENSE, NERVOUS, ANXIOUS, OR CAN'T SLEEP AT NIGHT BECAUSE THEIR MIND IS TROUBLED. HOW STRESSED ARE YOU?: ONLY A LITTLE

## 2022-03-01 SDOH — ECONOMIC STABILITY: TRANSPORTATION INSECURITY
IN THE PAST 12 MONTHS, HAS LACK OF RELIABLE TRANSPORTATION KEPT YOU FROM MEDICAL APPOINTMENTS, MEETINGS, WORK OR FROM GETTING THINGS NEEDED FOR DAILY LIVING?: NO

## 2022-03-01 ASSESSMENT — LIFESTYLE VARIABLES
HOW MANY STANDARD DRINKS CONTAINING ALCOHOL DO YOU HAVE ON A TYPICAL DAY: 1 OR 2
HOW OFTEN DO YOU HAVE SIX OR MORE DRINKS ON ONE OCCASION: MONTHLY
HOW OFTEN DO YOU HAVE A DRINK CONTAINING ALCOHOL: 2-3 TIMES A WEEK

## 2022-03-01 ASSESSMENT — SOCIAL DETERMINANTS OF HEALTH (SDOH)
DO YOU BELONG TO ANY CLUBS OR ORGANIZATIONS SUCH AS CHURCH GROUPS UNIONS, FRATERNAL OR ATHLETIC GROUPS, OR SCHOOL GROUPS?: NO
HOW OFTEN DO YOU GET TOGETHER WITH FRIENDS OR RELATIVES?: THREE TIMES A WEEK
HOW OFTEN DO YOU ATTENT MEETINGS OF THE CLUB OR ORGANIZATION YOU BELONG TO?: NEVER
ARE YOU MARRIED, WIDOWED, DIVORCED, SEPARATED, NEVER MARRIED, OR LIVING WITH A PARTNER?: NEVER MARRIED
ARE YOU MARRIED, WIDOWED, DIVORCED, SEPARATED, NEVER MARRIED, OR LIVING WITH A PARTNER?: NEVER MARRIED
HOW OFTEN DO YOU HAVE A DRINK CONTAINING ALCOHOL: 2-3 TIMES A WEEK
WITHIN THE PAST 12 MONTHS, YOU WORRIED THAT YOUR FOOD WOULD RUN OUT BEFORE YOU GOT THE MONEY TO BUY MORE: NEVER TRUE
HOW OFTEN DO YOU GET TOGETHER WITH FRIENDS OR RELATIVES?: THREE TIMES A WEEK
HOW MANY DRINKS CONTAINING ALCOHOL DO YOU HAVE ON A TYPICAL DAY WHEN YOU ARE DRINKING: 1 OR 2
IN A TYPICAL WEEK, HOW MANY TIMES DO YOU TALK ON THE PHONE WITH FAMILY, FRIENDS, OR NEIGHBORS?: MORE THAN THREE TIMES A WEEK
HOW OFTEN DO YOU ATTENT MEETINGS OF THE CLUB OR ORGANIZATION YOU BELONG TO?: NEVER
HOW OFTEN DO YOU HAVE SIX OR MORE DRINKS ON ONE OCCASION: MONTHLY
HOW OFTEN DO YOU ATTEND CHURCH OR RELIGIOUS SERVICES?: NEVER
HOW HARD IS IT FOR YOU TO PAY FOR THE VERY BASICS LIKE FOOD, HOUSING, MEDICAL CARE, AND HEATING?: NOT VERY HARD
HOW OFTEN DO YOU ATTEND CHURCH OR RELIGIOUS SERVICES?: NEVER
IN A TYPICAL WEEK, HOW MANY TIMES DO YOU TALK ON THE PHONE WITH FAMILY, FRIENDS, OR NEIGHBORS?: MORE THAN THREE TIMES A WEEK
DO YOU BELONG TO ANY CLUBS OR ORGANIZATIONS SUCH AS CHURCH GROUPS UNIONS, FRATERNAL OR ATHLETIC GROUPS, OR SCHOOL GROUPS?: NO

## 2022-03-03 ENCOUNTER — OFFICE VISIT (OUTPATIENT)
Dept: MEDICAL GROUP | Facility: MEDICAL CENTER | Age: 22
End: 2022-03-03
Payer: COMMERCIAL

## 2022-03-03 VITALS
BODY MASS INDEX: 26.79 KG/M2 | OXYGEN SATURATION: 98 % | TEMPERATURE: 97.2 F | SYSTOLIC BLOOD PRESSURE: 114 MMHG | HEIGHT: 60 IN | WEIGHT: 136.47 LBS | DIASTOLIC BLOOD PRESSURE: 56 MMHG | HEART RATE: 80 BPM

## 2022-03-03 DIAGNOSIS — R73.01 ELEVATED FASTING GLUCOSE: ICD-10-CM

## 2022-03-03 DIAGNOSIS — K62.5 BRIGHT RED BLOOD PER RECTUM: ICD-10-CM

## 2022-03-03 DIAGNOSIS — Z11.3 SCREENING EXAMINATION FOR STD (SEXUALLY TRANSMITTED DISEASE): ICD-10-CM

## 2022-03-03 PROBLEM — Z30.09 FAMILY PLANNING: Status: RESOLVED | Noted: 2021-11-18 | Resolved: 2022-03-03

## 2022-03-03 PROBLEM — R05.9 COUGH: Status: RESOLVED | Noted: 2021-12-08 | Resolved: 2022-03-03

## 2022-03-03 PROCEDURE — 99214 OFFICE O/P EST MOD 30 MIN: CPT | Performed by: FAMILY MEDICINE

## 2022-03-03 RX ORDER — HYDROCORTISONE ACETATE 25 MG/1
25 SUPPOSITORY RECTAL EVERY 12 HOURS
Qty: 24 SUPPOSITORY | Refills: 0 | Status: SHIPPED | OUTPATIENT
Start: 2022-03-03 | End: 2023-03-03

## 2022-03-03 ASSESSMENT — ENCOUNTER SYMPTOMS
CHILLS: 0
NERVOUS/ANXIOUS: 0
VOMITING: 0
NAUSEA: 0
PALPITATIONS: 0
SORE THROAT: 0
BLOOD IN STOOL: 1
COUGH: 0
BRUISES/BLEEDS EASILY: 0
SHORTNESS OF BREATH: 0
WEIGHT LOSS: 0
BLURRED VISION: 0
TINGLING: 0
WEAKNESS: 0
DIZZINESS: 0
ABDOMINAL PAIN: 0
DOUBLE VISION: 0
DIARRHEA: 0
FEVER: 0
MYALGIAS: 0
DEPRESSION: 0
CONSTIPATION: 0

## 2022-03-03 ASSESSMENT — PATIENT HEALTH QUESTIONNAIRE - PHQ9: CLINICAL INTERPRETATION OF PHQ2 SCORE: 0

## 2022-03-03 NOTE — ASSESSMENT & PLAN NOTE
Chronic.  I recommended that we repeat blood work to check her A1c levels to ensure that her numbers have improved and has not pushed her into the prediabetic range.

## 2022-03-03 NOTE — PATIENT INSTRUCTIONS
Increase fiber with fruits and veggies, if not enough take over the counter Benifiber.  Take a probiotic and try Miralax once a day.  Keep a food diary on what might be causing your symtomps

## 2022-03-03 NOTE — ASSESSMENT & PLAN NOTE
Chronic, unstable.  From what the patient describes I suspect that this is hemorrhoids.  I do recommend that she continue to use the steroid suppositories to help with any inflammation that she does have.  I then recommended that she increase the amount of fiber that she has as well as the use of MiraLAX to keep her stool softer allowing for easier passage.  I do feel that this is exacerbated by some constipation she may be having.  I went ahead and placed a referral to gastroenterology in the event that these do not help with her symptoms.  Recommend that she keep a food diary to see if there is anything that she is eating that is exacerbating her symptoms.

## 2022-03-03 NOTE — PROGRESS NOTES
José Manuel Damon is a pleasant 21 y.o. female here to establish care.    HPI:   Problem   Bright Red Blood Per Rectum    Has noticed bright red blood in her rectum off-and-on for the last several months.    Discussed this with her previous provider who did recommend steroid suppositories.    She is requesting refill of this medication though she reports that it will work off/ on.  Has not followed up with GI nor kept a food diary.  Reports blood on tissue paper and on top of the stool.  Firm and needing to push hard to get them out, denies constipation or diarrhea.       Elevated Fasting Glucose    Had blood work completed a few years ago which showed an A1c of 5.6 on 09/2020.  Was told that she has borderline sugar levels though she has not had follow-up from this.     Cough (Resolved)   Family Planning (Resolved)   Fatigue (Resolved)        Health Maintenance  Position: Patient states she is up-to-date with all of her immunizations though she does show that she is in need of her COVID booster and her varicella vaccine series.    Current medicines (including changes today)  Current Outpatient Medications   Medication Sig Dispense Refill   • hydrocortisone (ANUSOL-HC) 25 MG Suppos Insert 1 Suppository into the rectum every 12 hours. 24 Suppository 0   • Multiple Vitamins-Minerals (WOMENS MULTI PO) Take  by mouth.     • Adapalene-Benzoyl Peroxide 0.1-2.5 % Gel      • fluticasone (FLONASE) 50 MCG/ACT nasal spray INSTILL 1 SPRAY IN EACH NOSTRIL TWICE DAILY 48 g 0   • BLISOVI FE 1/20 1-20 MG-MCG per tablet Take  by mouth every day.       No current facility-administered medications for this visit.       Past Medical/ Surgical History  She  has a past medical history of Allergy and Anxiety.  She  has a past surgical history that includes tonsillectomy and adenoidectomy and tonsillectomy.    Social History  Social History     Tobacco Use   • Smoking status: Former Smoker   • Smokeless tobacco: Never Used   Vaping Use    • Vaping Use: Never used   Substance Use Topics   • Alcohol use: Yes     Alcohol/week: 0.6 oz     Types: 1 Cans of beer per week     Comment: 2 times a week   • Drug use: Yes     Types: Marijuana     Comment: occasional     Social History     Social History Narrative   • Not on file        Family History  Family History   Problem Relation Age of Onset   • Hypertension Mother    • Hyperlipidemia Mother    • Psychiatric Illness Father         bipolar   • Lupus Sister    • Hypertension Maternal Grandmother    • Diabetes Maternal Grandmother    • Heart Disease Maternal Grandfather      Family Status   Relation Name Status   • Mo  Alive   • Fa  Alive   • Sis  Alive   • MGMo  Alive   • MGFa     • PGMo  Alive         Review of Systems   Constitutional: Negative for chills, fever, malaise/fatigue and weight loss.   HENT: Negative for hearing loss and sore throat.    Eyes: Negative for blurred vision and double vision.   Respiratory: Negative for cough and shortness of breath.    Cardiovascular: Negative for chest pain, palpitations and leg swelling.   Gastrointestinal: Positive for blood in stool. Negative for abdominal pain, constipation, diarrhea, nausea and vomiting.   Musculoskeletal: Negative for myalgias.   Skin: Negative for rash.   Neurological: Negative for dizziness, tingling and weakness.   Endo/Heme/Allergies: Does not bruise/bleed easily.   Psychiatric/Behavioral: Negative for depression. The patient is not nervous/anxious.          Objective:     /56 (BP Location: Right arm, Patient Position: Sitting, BP Cuff Size: Adult)   Pulse 80   Temp 36.2 °C (97.2 °F) (Temporal)   Ht 1.524 m (5')   Wt 61.9 kg (136 lb 7.4 oz)   SpO2 98%  Body mass index is 26.65 kg/m².    Physical Exam  Constitutional:       General: She is not in acute distress.  HENT:      Head: Normocephalic and atraumatic.      Right Ear: External ear normal.      Left Ear: External ear normal.   Eyes:      General: Lids are  normal.      Extraocular Movements: Extraocular movements intact.      Conjunctiva/sclera: Conjunctivae normal.      Pupils: Pupils are equal, round, and reactive to light.   Neck:      Trachea: Trachea normal.   Cardiovascular:      Rate and Rhythm: Normal rate and regular rhythm.      Heart sounds: Normal heart sounds. No murmur heard.    No friction rub. No gallop.   Pulmonary:      Effort: Pulmonary effort is normal. No accessory muscle usage.      Breath sounds: Normal breath sounds. No wheezing or rales.   Abdominal:      General: Bowel sounds are normal.      Palpations: Abdomen is soft.      Tenderness: There is no abdominal tenderness.   Musculoskeletal:      Cervical back: Normal range of motion and neck supple.      Right lower leg: No edema.      Left lower leg: No edema.   Lymphadenopathy:      Cervical: No cervical adenopathy.   Skin:     General: Skin is warm and dry.      Findings: No rash.   Neurological:      General: No focal deficit present.      Mental Status: She is alert and oriented to person, place, and time.      GCS: GCS eye subscore is 4. GCS verbal subscore is 5. GCS motor subscore is 6.      Gait: Gait is intact.      Deep Tendon Reflexes:      Reflex Scores:       Brachioradialis reflexes are 2+ on the right side and 2+ on the left side.       Patellar reflexes are 2+ on the right side and 2+ on the left side.  Psychiatric:         Attention and Perception: Attention normal.         Mood and Affect: Affect normal.         Speech: Speech normal.        Imaging:  No recent imaging to review    Labs:  Most recent labs from 09/11/2020 reviewed with patient.  Assessment and Plan:   The following treatment plan was discussed    Problem List Items Addressed This Visit     Bright red blood per rectum     Chronic, unstable.  From what the patient describes I suspect that this is hemorrhoids.  I do recommend that she continue to use the steroid suppositories to help with any inflammation that  she does have.  I then recommended that she increase the amount of fiber that she has as well as the use of MiraLAX to keep her stool softer allowing for easier passage.  I do feel that this is exacerbated by some constipation she may be having.  I went ahead and placed a referral to gastroenterology in the event that these do not help with her symptoms.  Recommend that she keep a food diary to see if there is anything that she is eating that is exacerbating her symptoms.         Relevant Medications    hydrocortisone (ANUSOL-HC) 25 MG Suppos    Other Relevant Orders    Referral to Gastroenterology    Elevated fasting glucose     Chronic.  I recommended that we repeat blood work to check her A1c levels to ensure that her numbers have improved and has not pushed her into the prediabetic range.         Relevant Orders    HEMOGLOBIN A1C    Comp Metabolic Panel      Other Visit Diagnoses     Screening examination for STD (sexually transmitted disease)        Relevant Orders    Chlamydia/GC PCR Urine (Clinic Collect - Urine)           Records requested.  Followup: Return in about 1 year (around 3/3/2023), or if symptoms worsen or fail to improve, for annual.      Please note that this dictation was created using voice recognition software. I have made every reasonable attempt to correct obvious errors, but I expect that there are errors of grammar and possibly content that I did not discover before finalizing the note.

## 2022-03-31 ENCOUNTER — HOSPITAL ENCOUNTER (OUTPATIENT)
Dept: LAB | Facility: MEDICAL CENTER | Age: 22
End: 2022-03-31
Attending: FAMILY MEDICINE
Payer: COMMERCIAL

## 2022-03-31 DIAGNOSIS — Z11.3 SCREENING EXAMINATION FOR STD (SEXUALLY TRANSMITTED DISEASE): ICD-10-CM

## 2022-03-31 DIAGNOSIS — R73.01 ELEVATED FASTING GLUCOSE: ICD-10-CM

## 2022-03-31 LAB
ALBUMIN SERPL BCP-MCNC: 4.7 G/DL (ref 3.2–4.9)
ALBUMIN/GLOB SERPL: 3.1 G/DL
ALP SERPL-CCNC: 40 U/L (ref 30–99)
ALT SERPL-CCNC: 10 U/L (ref 2–50)
ANION GAP SERPL CALC-SCNC: 13 MMOL/L (ref 7–16)
AST SERPL-CCNC: 15 U/L (ref 12–45)
BILIRUB SERPL-MCNC: 0.2 MG/DL (ref 0.1–1.5)
BUN SERPL-MCNC: 10 MG/DL (ref 8–22)
CALCIUM SERPL-MCNC: 9.3 MG/DL (ref 8.5–10.5)
CHLORIDE SERPL-SCNC: 105 MMOL/L (ref 96–112)
CO2 SERPL-SCNC: 23 MMOL/L (ref 20–33)
CREAT SERPL-MCNC: 1.01 MG/DL (ref 0.5–1.4)
EST. AVERAGE GLUCOSE BLD GHB EST-MCNC: 105 MG/DL
GFR SERPLBLD CREATININE-BSD FMLA CKD-EPI: 80 ML/MIN/1.73 M 2
GLOBULIN SER CALC-MCNC: 1.5 G/DL (ref 1.9–3.5)
GLUCOSE SERPL-MCNC: 83 MG/DL (ref 65–99)
HBA1C MFR BLD: 5.3 % (ref 4–5.6)
POTASSIUM SERPL-SCNC: 4.5 MMOL/L (ref 3.6–5.5)
PROT SERPL-MCNC: 6.2 G/DL (ref 6–8.2)
SODIUM SERPL-SCNC: 141 MMOL/L (ref 135–145)

## 2022-03-31 PROCEDURE — 36415 COLL VENOUS BLD VENIPUNCTURE: CPT

## 2022-03-31 PROCEDURE — 87491 CHLMYD TRACH DNA AMP PROBE: CPT

## 2022-03-31 PROCEDURE — 80053 COMPREHEN METABOLIC PANEL: CPT

## 2022-03-31 PROCEDURE — 87591 N.GONORRHOEAE DNA AMP PROB: CPT

## 2022-03-31 PROCEDURE — 83036 HEMOGLOBIN GLYCOSYLATED A1C: CPT

## 2022-04-01 LAB
C TRACH DNA SPEC QL NAA+PROBE: NEGATIVE
N GONORRHOEA DNA SPEC QL NAA+PROBE: NEGATIVE
SPECIMEN SOURCE: NORMAL

## 2022-04-26 ENCOUNTER — OFFICE VISIT (OUTPATIENT)
Dept: URGENT CARE | Facility: CLINIC | Age: 22
End: 2022-04-26
Payer: COMMERCIAL

## 2022-04-26 ENCOUNTER — HOSPITAL ENCOUNTER (OUTPATIENT)
Facility: MEDICAL CENTER | Age: 22
End: 2022-04-26
Attending: NURSE PRACTITIONER
Payer: COMMERCIAL

## 2022-04-26 VITALS
WEIGHT: 140 LBS | TEMPERATURE: 99.1 F | BODY MASS INDEX: 25.76 KG/M2 | HEIGHT: 62 IN | DIASTOLIC BLOOD PRESSURE: 66 MMHG | HEART RATE: 60 BPM | RESPIRATION RATE: 16 BRPM | SYSTOLIC BLOOD PRESSURE: 104 MMHG | OXYGEN SATURATION: 98 %

## 2022-04-26 DIAGNOSIS — R50.9 LOW GRADE FEVER: ICD-10-CM

## 2022-04-26 DIAGNOSIS — G44.89 OTHER HEADACHE SYNDROME: ICD-10-CM

## 2022-04-26 DIAGNOSIS — B34.9 NONSPECIFIC SYNDROME SUGGESTIVE OF VIRAL ILLNESS: ICD-10-CM

## 2022-04-26 DIAGNOSIS — J02.9 SORE THROAT: ICD-10-CM

## 2022-04-26 LAB
INT CON NEG: NORMAL
INT CON POS: NORMAL
S PYO AG THROAT QL: NEGATIVE

## 2022-04-26 PROCEDURE — 87880 STREP A ASSAY W/OPTIC: CPT | Performed by: NURSE PRACTITIONER

## 2022-04-26 PROCEDURE — 99213 OFFICE O/P EST LOW 20 MIN: CPT | Performed by: NURSE PRACTITIONER

## 2022-04-26 PROCEDURE — 87070 CULTURE OTHR SPECIMN AEROBIC: CPT

## 2022-04-26 RX ORDER — NORETHINDRONE ACETATE AND ETHINYL ESTRADIOL .02; 1 MG/1; MG/1
TABLET ORAL
COMMUNITY
Start: 2022-03-10 | End: 2023-03-03

## 2022-04-26 NOTE — PROGRESS NOTES
"Subjective:   José Manuel Damon is a 21 y.o. female who presents for Sore Throat (X2 days, painful to swallow ) and Headache       HPI  Patient presents for evaluation of 2-day history of sore throat, pain with swallowing, headache, and fatigue.  Patient has not take anything for her symptoms.  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  Patient is COVID vaccinated, including booster.    ROS  All other systems are negative except as documented above within HPI.    MEDS:   Current Outpatient Medications:   •  norethindrone-ethinyl estradiol (MICROGESTIN 1/20) 1-20 MG-MCG per tablet, , Disp: , Rfl:   •  hydrocortisone (ANUSOL-HC) 25 MG Suppos, Insert 1 Suppository into the rectum every 12 hours., Disp: 24 Suppository, Rfl: 0  •  Multiple Vitamins-Minerals (WOMENS MULTI PO), Take  by mouth., Disp: , Rfl:   •  fluticasone (FLONASE) 50 MCG/ACT nasal spray, INSTILL 1 SPRAY IN EACH NOSTRIL TWICE DAILY, Disp: 48 g, Rfl: 0  •  Adapalene-Benzoyl Peroxide 0.1-2.5 % Gel, , Disp: , Rfl:   •  BLISOVI FE 1/20 1-20 MG-MCG per tablet, Take  by mouth every day. (Patient not taking: Reported on 4/26/2022), Disp: , Rfl:   ALLERGIES:   Allergies   Allergen Reactions   • [place] [Drospirenone-Ethinyl Estradiol] Swelling     Facial swelling       Patient's PMH, SocHx, SurgHx, FamHx, Drug allergies and medications were reviewed.     Objective:   /66   Pulse 60   Temp 37.3 °C (99.1 °F) (Temporal)   Resp 16   Ht 1.575 m (5' 2\")   Wt 63.5 kg (140 lb)   SpO2 98%   BMI 25.61 kg/m²     Physical Exam  Vitals and nursing note reviewed.   Constitutional:       General: She is awake.      Appearance: Normal appearance. She is well-developed and normal weight.   HENT:      Head: Normocephalic and atraumatic.      Right Ear: Tympanic membrane, ear canal and external ear normal.      Left Ear: Tympanic membrane, ear canal and external ear " normal.      Nose: Nose normal.      Mouth/Throat:      Lips: Pink.      Mouth: Mucous membranes are moist.      Pharynx: Oropharynx is clear. Uvula midline. Posterior oropharyngeal erythema present.   Eyes:      Extraocular Movements: Extraocular movements intact.      Conjunctiva/sclera: Conjunctivae normal.      Pupils: Pupils are equal, round, and reactive to light.   Neck:      Thyroid: No thyromegaly.      Trachea: Trachea normal.   Cardiovascular:      Rate and Rhythm: Normal rate and regular rhythm.      Pulses: Normal pulses.      Heart sounds: Normal heart sounds, S1 normal and S2 normal.   Pulmonary:      Effort: Pulmonary effort is normal. No respiratory distress.      Breath sounds: Normal breath sounds. No wheezing, rhonchi or rales.   Abdominal:      General: Bowel sounds are normal.      Palpations: Abdomen is soft.   Musculoskeletal:         General: Normal range of motion.      Cervical back: Full passive range of motion without pain, normal range of motion and neck supple.   Lymphadenopathy:      Cervical: No cervical adenopathy.   Skin:     General: Skin is warm and dry.      Capillary Refill: Capillary refill takes less than 2 seconds.   Neurological:      General: No focal deficit present.      Mental Status: She is alert and oriented to person, place, and time.      Gait: Gait is intact.   Psychiatric:         Attention and Perception: Attention and perception normal.         Mood and Affect: Mood normal.         Speech: Speech normal.         Behavior: Behavior normal. Behavior is cooperative.         Thought Content: Thought content normal.         Judgment: Judgment normal.         Assessment/Plan:   Assessment    1. Nonspecific syndrome suggestive of viral illness    2. Sore throat  - POCT Rapid Strep A-negative  - CULTURE THROAT; Future    3. Other headache syndrome  - POCT Rapid Strep A  - CULTURE THROAT; Future    4. Low grade fever  - POCT Rapid Strep A  - CULTURE THROAT;  Future      Vital signs stable at today's acute urgent care visit.  Reviewed test results that were completed in the clinic.  Will obtain throat culture, and will treat accordingly pending those results.  Discussed management options as indicated.    Advised the patient to follow-up with the primary care provider for recheck, reevaluation, and/or consideration of further management. Return to urgent care with any worsening/continued symptoms.  Red flags discussed and indications to immediately call 911 or present to the ED.  All questions were encouraged and answered to the patient's satisfaction and understanding, and they agree to the plan of care.     I personally reviewed prior external notes and test results pertinent to today's visit.  I have independently reviewed and interpreted all diagnostics ordered during this urgent care acute visit. Time spent evaluating this patient was a minimum of 30 minutes and includes preparing for visit, counseling/education, exam, evaluation, obtaining history, and ordering lab/test/procedures.      Please note that this dictation was created using voice recognition software. I have made a reasonable attempt to correct obvious errors, but I expect that there are errors of grammar and possibly content that I did not discover before finalizing the note.

## 2022-04-29 LAB
BACTERIA SPEC RESP CULT: NORMAL
SIGNIFICANT IND 70042: NORMAL
SITE SITE: NORMAL
SOURCE SOURCE: NORMAL

## 2023-03-03 ENCOUNTER — OFFICE VISIT (OUTPATIENT)
Dept: URGENT CARE | Facility: CLINIC | Age: 23
End: 2023-03-03
Payer: COMMERCIAL

## 2023-03-03 VITALS
HEART RATE: 84 BPM | SYSTOLIC BLOOD PRESSURE: 120 MMHG | RESPIRATION RATE: 14 BRPM | BODY MASS INDEX: 24.84 KG/M2 | WEIGHT: 135 LBS | HEIGHT: 62 IN | DIASTOLIC BLOOD PRESSURE: 60 MMHG | OXYGEN SATURATION: 96 % | TEMPERATURE: 98.7 F

## 2023-03-03 DIAGNOSIS — J98.8 RTI (RESPIRATORY TRACT INFECTION): ICD-10-CM

## 2023-03-03 PROCEDURE — 99213 OFFICE O/P EST LOW 20 MIN: CPT | Performed by: PHYSICIAN ASSISTANT

## 2023-03-03 RX ORDER — BENZONATATE 200 MG/1
200 CAPSULE ORAL 3 TIMES DAILY PRN
Qty: 30 CAPSULE | Refills: 0 | Status: SHIPPED | OUTPATIENT
Start: 2023-03-03 | End: 2023-12-22

## 2023-03-03 RX ORDER — METHYLPREDNISOLONE 4 MG/1
TABLET ORAL
Qty: 21 TABLET | Refills: 0 | Status: SHIPPED | OUTPATIENT
Start: 2023-03-03 | End: 2023-12-22

## 2023-03-03 RX ORDER — NORETHINDRONE ACETATE AND ETHINYL ESTRADIOL, ETHINYL ESTRADIOL AND FERROUS FUMARATE 1MG-10(24)
KIT ORAL
COMMUNITY
Start: 2023-03-01

## 2023-03-03 RX ORDER — AZITHROMYCIN 250 MG/1
TABLET, FILM COATED ORAL
Qty: 6 TABLET | Refills: 0 | Status: SHIPPED | OUTPATIENT
Start: 2023-03-03 | End: 2023-12-22

## 2023-03-03 ASSESSMENT — ENCOUNTER SYMPTOMS
SORE THROAT: 0
MYALGIAS: 0
VOMITING: 0
WHEEZING: 1
HEMOPTYSIS: 0
SHORTNESS OF BREATH: 1
ABDOMINAL PAIN: 0
DIARRHEA: 0
SINUS PAIN: 0
NAUSEA: 0
HEADACHES: 0
RHINORRHEA: 1
CHILLS: 0
FEVER: 0
COUGH: 1
SPUTUM PRODUCTION: 1

## 2023-03-04 NOTE — PROGRESS NOTES
Subjective     José Manuel Damon is a 22 y.o. female who presents with Cough (X 2 weeks, sinus congestion, cough.)            Cough  This is a new problem. Episode onset: > 2 weeks. The problem has been gradually worsening. The cough is Productive of sputum (mucous in the am). Associated symptoms include nasal congestion, rhinorrhea, shortness of breath and wheezing. Pertinent negatives include no chest pain, chills, ear congestion, ear pain, fever, headaches, hemoptysis, myalgias, rash or sore throat. The symptoms are aggravated by lying down. She has tried OTC cough suppressant for the symptoms. Improvement on treatment: minimal. Her past medical history is significant for bronchitis.       Past Medical History:   Diagnosis Date    Allergy     Anxiety      Past Surgical History:   Procedure Laterality Date    TONSILLECTOMY      TONSILLECTOMY AND ADENOIDECTOMY             Family History   Problem Relation Age of Onset    Hypertension Mother     Hyperlipidemia Mother     Psychiatric Illness Father         bipolar    Lupus Sister     Hypertension Maternal Grandmother     Diabetes Maternal Grandmother     Heart Disease Maternal Grandfather        Allergies:  Carmel [drospirenone-ethinyl estradiol]      Medications, Allergies, and current problem list reviewed today in Epic    Review of Systems   Constitutional:  Positive for malaise/fatigue. Negative for chills and fever.   HENT:  Positive for congestion and rhinorrhea. Negative for ear discharge, ear pain, sinus pain and sore throat.    Respiratory:  Positive for cough, sputum production, shortness of breath and wheezing. Negative for hemoptysis.    Cardiovascular:  Negative for chest pain.   Gastrointestinal:  Negative for abdominal pain, diarrhea, nausea and vomiting.   Musculoskeletal:  Negative for myalgias.   Skin:  Negative for rash.   Neurological:  Negative for headaches.      All other systems reviewed and are negative.         Objective     /60    "Pulse 84   Temp 37.1 °C (98.7 °F) (Temporal)   Resp 14   Ht 1.575 m (5' 2\")   Wt 61.2 kg (135 lb)   SpO2 96%   BMI 24.69 kg/m²      Physical Exam  Constitutional:       General: She is not in acute distress.     Appearance: She is not ill-appearing.   HENT:      Head: Normocephalic and atraumatic.      Nose: Nose normal.      Mouth/Throat:      Mouth: Mucous membranes are moist.      Pharynx: No posterior oropharyngeal erythema.   Eyes:      Conjunctiva/sclera: Conjunctivae normal.   Cardiovascular:      Rate and Rhythm: Normal rate and regular rhythm.      Heart sounds: Normal heart sounds.   Pulmonary:      Effort: Pulmonary effort is normal. No respiratory distress.      Breath sounds: No stridor. Wheezing present. No rhonchi or rales.      Comments: Spasmodic cough with mild expiratory wheezes   Skin:     General: Skin is warm and dry.      Findings: No rash.   Neurological:      General: No focal deficit present.      Mental Status: She is alert and oriented to person, place, and time.   Psychiatric:         Mood and Affect: Mood normal.         Behavior: Behavior normal.         Thought Content: Thought content normal.         Judgment: Judgment normal.                           Assessment & Plan        1. RTI (respiratory tract infection)  Bronchitis- concerns of progression to secondary bacterial infection due to duration of symptoms.   - methylPREDNISolone (MEDROL DOSEPAK) 4 MG Tablet Therapy Pack; Follow schedule on package instructions.  Dispense: 21 Tablet; Refill: 0  - azithromycin (ZITHROMAX) 250 MG Tab; Take as directed on package. 1 pack.  Dispense: 6 Tablet; Refill: 0  - benzonatate (TESSALON) 200 MG capsule; Take 1 Capsule by mouth 3 times a day as needed for Cough.  Dispense: 30 Capsule; Refill: 0         Differential diagnoses, Supportive care, and indications for immediate follow-up discussed with patient.   Pathogenesis of diagnosis discussed including typical length and natural " progression.   Instructed to return to clinic or nearest emergency department for any change in condition, further concerns, or worsening of symptoms.      The patient demonstrated a good understanding and agreed with the treatment plan.    Stephanie Alicea P.A.-C.

## 2023-11-25 ENCOUNTER — OFFICE VISIT (OUTPATIENT)
Dept: URGENT CARE | Facility: CLINIC | Age: 23
End: 2023-11-25
Payer: COMMERCIAL

## 2023-11-25 VITALS
WEIGHT: 140 LBS | SYSTOLIC BLOOD PRESSURE: 100 MMHG | TEMPERATURE: 97.6 F | HEIGHT: 62 IN | DIASTOLIC BLOOD PRESSURE: 60 MMHG | BODY MASS INDEX: 25.76 KG/M2 | HEART RATE: 79 BPM | RESPIRATION RATE: 16 BRPM | OXYGEN SATURATION: 99 %

## 2023-11-25 DIAGNOSIS — N30.01 ACUTE CYSTITIS WITH HEMATURIA: ICD-10-CM

## 2023-11-25 DIAGNOSIS — R30.0 DYSURIA: ICD-10-CM

## 2023-11-25 LAB
APPEARANCE UR: NORMAL
BILIRUB UR STRIP-MCNC: NORMAL MG/DL
COLOR UR AUTO: NORMAL
GLUCOSE UR STRIP.AUTO-MCNC: NORMAL MG/DL
KETONES UR STRIP.AUTO-MCNC: NORMAL MG/DL
LEUKOCYTE ESTERASE UR QL STRIP.AUTO: NORMAL
NITRITE UR QL STRIP.AUTO: NORMAL
PH UR STRIP.AUTO: 7 [PH] (ref 5–8)
PROT UR QL STRIP: NORMAL MG/DL
RBC UR QL AUTO: NORMAL
SP GR UR STRIP.AUTO: 1.01
UROBILINOGEN UR STRIP-MCNC: 0.2 MG/DL

## 2023-11-25 PROCEDURE — 3074F SYST BP LT 130 MM HG: CPT | Performed by: FAMILY MEDICINE

## 2023-11-25 PROCEDURE — 81002 URINALYSIS NONAUTO W/O SCOPE: CPT | Performed by: FAMILY MEDICINE

## 2023-11-25 PROCEDURE — 3078F DIAST BP <80 MM HG: CPT | Performed by: FAMILY MEDICINE

## 2023-11-25 PROCEDURE — 99213 OFFICE O/P EST LOW 20 MIN: CPT | Performed by: FAMILY MEDICINE

## 2023-11-25 RX ORDER — CIPROFLOXACIN 250 MG/1
250 TABLET, FILM COATED ORAL 2 TIMES DAILY
Qty: 14 TABLET | Refills: 0 | Status: SHIPPED | OUTPATIENT
Start: 2023-11-25 | End: 2023-12-02

## 2023-11-25 ASSESSMENT — ENCOUNTER SYMPTOMS
CONSTITUTIONAL NEGATIVE: 1
RESPIRATORY NEGATIVE: 1
CARDIOVASCULAR NEGATIVE: 1

## 2023-11-25 NOTE — PROGRESS NOTES
"Subjective:   José Manuel Damon is a 23 y.o. female who presents for UTI (X 1 week, Painful when urinating, frequent urination, patient explains she believed she does have a yeast infection, did do Monistat at home x 1 day ago)      UTI        Review of Systems   Constitutional: Negative.    Respiratory: Negative.     Cardiovascular: Negative.    Genitourinary:  Positive for dysuria, frequency, hematuria and urgency.       Medications, Allergies, and current problem list reviewed today in Epic.     Objective:     /60 (BP Location: Left arm, Patient Position: Sitting, BP Cuff Size: Large adult)   Pulse 79   Temp 36.4 °C (97.6 °F)   Resp 16   Ht 1.575 m (5' 2\")   Wt 63.5 kg (140 lb)   SpO2 99%     Physical Exam  Vitals and nursing note reviewed.   Constitutional:       Appearance: Normal appearance.   Cardiovascular:      Rate and Rhythm: Normal rate and regular rhythm.      Pulses: Normal pulses.      Heart sounds: Normal heart sounds.   Pulmonary:      Effort: Pulmonary effort is normal.      Breath sounds: Normal breath sounds.   Abdominal:      General: Abdomen is flat. Bowel sounds are normal.      Palpations: Abdomen is soft.   Neurological:      Mental Status: She is alert.         Assessment/Plan:     Diagnosis and associated orders:     1. Acute cystitis with hematuria  ciprofloxacin (CIPRO) 250 MG Tab      2. Dysuria  POCT Urinalysis         Comments/MDM:              Differential diagnosis, natural history, supportive care, and indications for immediate follow-up discussed.    Advised the patient to follow-up with the primary care physician for recheck, reevaluation, and consideration of further management.    Please note that this dictation was created using voice recognition software. I have made a reasonable attempt to correct obvious errors, but I expect that there are errors of grammar and possibly content that I did not discover before finalizing the note.    This note was electronically " signed by Tarun Morales M.D.

## 2023-12-22 ENCOUNTER — OFFICE VISIT (OUTPATIENT)
Dept: URGENT CARE | Facility: CLINIC | Age: 23
End: 2023-12-22
Payer: COMMERCIAL

## 2023-12-22 VITALS
RESPIRATION RATE: 14 BRPM | HEART RATE: 108 BPM | BODY MASS INDEX: 27.8 KG/M2 | SYSTOLIC BLOOD PRESSURE: 110 MMHG | OXYGEN SATURATION: 97 % | TEMPERATURE: 99.6 F | DIASTOLIC BLOOD PRESSURE: 62 MMHG | WEIGHT: 141.6 LBS | HEIGHT: 60 IN

## 2023-12-22 DIAGNOSIS — R05.1 ACUTE COUGH: ICD-10-CM

## 2023-12-22 DIAGNOSIS — J01.90 ACUTE BACTERIAL SINUSITIS: ICD-10-CM

## 2023-12-22 DIAGNOSIS — B96.89 ACUTE BACTERIAL SINUSITIS: ICD-10-CM

## 2023-12-22 PROCEDURE — 99213 OFFICE O/P EST LOW 20 MIN: CPT | Performed by: FAMILY MEDICINE

## 2023-12-22 PROCEDURE — 3078F DIAST BP <80 MM HG: CPT | Performed by: FAMILY MEDICINE

## 2023-12-22 PROCEDURE — 3074F SYST BP LT 130 MM HG: CPT | Performed by: FAMILY MEDICINE

## 2023-12-22 RX ORDER — AMOXICILLIN AND CLAVULANATE POTASSIUM 875; 125 MG/1; MG/1
1 TABLET, FILM COATED ORAL 2 TIMES DAILY
Qty: 14 TABLET | Refills: 0 | Status: SHIPPED | OUTPATIENT
Start: 2023-12-22 | End: 2023-12-29

## 2023-12-22 RX ORDER — HYDROCODONE POLISTIREX AND CHLORPHENIRAMINE POLISTIREX 10; 8 MG/5ML; MG/5ML
5 SUSPENSION, EXTENDED RELEASE ORAL EVERY 12 HOURS PRN
Qty: 70 ML | Refills: 0 | Status: SHIPPED | OUTPATIENT
Start: 2023-12-22 | End: 2023-12-29

## 2023-12-22 RX ORDER — DEXAMETHASONE 6 MG/1
6 TABLET ORAL DAILY
Qty: 3 TABLET | Refills: 0 | Status: SHIPPED | OUTPATIENT
Start: 2023-12-22 | End: 2023-12-25

## 2023-12-22 ASSESSMENT — ENCOUNTER SYMPTOMS
COUGH: 1
SINUS PAIN: 1

## 2023-12-22 NOTE — PROGRESS NOTES
Subjective     José Manuel Damon is a 23 y.o. female who presents with Cough (X 10 days, worsening cough, chest congestion in the morning.)    - This is a very pleasant 23 y.o. who has come to the walk-in clinic today for ~10-14 days came down w/ sinus congestion/pain/pressure colorful dc and cough. Cough worse at night and can't sleep, otc meds not helping. No nvfc.           ALLERGIES:  Carmel [drospirenone-ethinyl estradiol]     PMH:  Past Medical History:   Diagnosis Date    Allergy     Anxiety         PSH:  Past Surgical History:   Procedure Laterality Date    TONSILLECTOMY      TONSILLECTOMY AND ADENOIDECTOMY         MEDS:    Current Outpatient Medications:     amoxicillin-clavulanate (AUGMENTIN) 875-125 MG Tab, Take 1 Tablet by mouth 2 times a day for 7 days., Disp: 14 Tablet, Rfl: 0    hydrocod kirit-chlorphe kirit ER (TUSSIONEX) 10-8 MG/5ML Suspension Extended Release, Take 5 mL by mouth every 12 hours as needed for Cough for up to 7 days., Disp: 70 mL, Rfl: 0    dexamethasone (DECADRON) 6 MG Tab, Take 1 Tablet by mouth every day for 3 days., Disp: 3 Tablet, Rfl: 0    LO LOESTRIN FE 1 MG-10 MCG / 10 MCG Tab, , Disp: , Rfl:     ** I have documented what I find to be significant in regards to past medical, social, family and surgical history  in my HPI or under PMH/PSH/FH review section, otherwise it is noncontributory **           HPI    Review of Systems   HENT:  Positive for congestion and sinus pain.    Respiratory:  Positive for cough.    All other systems reviewed and are negative.             Objective     /62   Pulse (!) 108   Temp 37.6 °C (99.6 °F) (Temporal)   Resp 14   Ht 1.524 m (5')   Wt 64.2 kg (141 lb 9.6 oz)   SpO2 97%   BMI 27.65 kg/m²      Physical Exam  Vitals and nursing note reviewed.   Constitutional:       General: She is not in acute distress.     Appearance: Normal appearance. She is well-developed.   HENT:      Head: Normocephalic.      Nose: Congestion and rhinorrhea  present.      Mouth/Throat:      Mouth: Mucous membranes are moist.      Pharynx: Oropharynx is clear. No oropharyngeal exudate or posterior oropharyngeal erythema.   Cardiovascular:      Heart sounds: Normal heart sounds. No murmur heard.  Pulmonary:      Effort: Pulmonary effort is normal. No respiratory distress.      Breath sounds: Normal breath sounds. No wheezing, rhonchi or rales.   Neurological:      Mental Status: She is alert.      Motor: No abnormal muscle tone.   Psychiatric:         Mood and Affect: Mood normal.         Behavior: Behavior normal.         Assessment & Plan     1. Acute bacterial sinusitis  amoxicillin-clavulanate (AUGMENTIN) 875-125 MG Tab      2. Acute cough  hydrocod kirit-chlorphe kirit ER (TUSSIONEX) 10-8 MG/5ML Suspension Extended Release    dexamethasone (DECADRON) 6 MG Tab          - Dx, plan & d/c instructions discussed   - Rest, stay hydrated  - OTC Flonase      Follow up with your regular primary care providers office within a week to keep them updated and informed of this visit and for regular routine health maintenance check-ups. ER if not improving in 2-3 days or if feeling/getting worse.     Patient left in stable condition         Pertinent prior lab work and/or imaging studies in Epic have been reviewed by me today on day of this visit and taken into account for my treatment and plan today    Pertinent PMH/PSH and/or chronic conditions and medications if any were reviewed today and taken into account for my treatment and plan today    Pertinent prior office visit notes in [facility] have been reviewed by me today on day of this visit.    Please note that this dictation may have been created using voice recognition software, if so I have made every reasonable attempt to correct obvious errors, but I expect that there are errors of grammar and possibly content that I did not discover before finalizing the note.

## 2024-02-28 SDOH — ECONOMIC STABILITY: FOOD INSECURITY: WITHIN THE PAST 12 MONTHS, THE FOOD YOU BOUGHT JUST DIDN'T LAST AND YOU DIDN'T HAVE MONEY TO GET MORE.: NEVER TRUE

## 2024-02-28 SDOH — HEALTH STABILITY: PHYSICAL HEALTH: ON AVERAGE, HOW MANY MINUTES DO YOU ENGAGE IN EXERCISE AT THIS LEVEL?: 50 MIN

## 2024-02-28 SDOH — ECONOMIC STABILITY: HOUSING INSECURITY: IN THE LAST 12 MONTHS, HOW MANY PLACES HAVE YOU LIVED?: 2

## 2024-02-28 SDOH — HEALTH STABILITY: PHYSICAL HEALTH: ON AVERAGE, HOW MANY DAYS PER WEEK DO YOU ENGAGE IN MODERATE TO STRENUOUS EXERCISE (LIKE A BRISK WALK)?: 5 DAYS

## 2024-02-28 SDOH — ECONOMIC STABILITY: FOOD INSECURITY: WITHIN THE PAST 12 MONTHS, YOU WORRIED THAT YOUR FOOD WOULD RUN OUT BEFORE YOU GOT MONEY TO BUY MORE.: NEVER TRUE

## 2024-02-28 SDOH — ECONOMIC STABILITY: INCOME INSECURITY: HOW HARD IS IT FOR YOU TO PAY FOR THE VERY BASICS LIKE FOOD, HOUSING, MEDICAL CARE, AND HEATING?: NOT HARD AT ALL

## 2024-02-28 SDOH — ECONOMIC STABILITY: INCOME INSECURITY: IN THE LAST 12 MONTHS, WAS THERE A TIME WHEN YOU WERE NOT ABLE TO PAY THE MORTGAGE OR RENT ON TIME?: NO

## 2024-02-28 SDOH — HEALTH STABILITY: MENTAL HEALTH
STRESS IS WHEN SOMEONE FEELS TENSE, NERVOUS, ANXIOUS, OR CAN'T SLEEP AT NIGHT BECAUSE THEIR MIND IS TROUBLED. HOW STRESSED ARE YOU?: NOT AT ALL

## 2024-02-28 ASSESSMENT — LIFESTYLE VARIABLES
SKIP TO QUESTIONS 9-10: 0
HOW OFTEN DO YOU HAVE A DRINK CONTAINING ALCOHOL: 2-3 TIMES A WEEK
HOW MANY STANDARD DRINKS CONTAINING ALCOHOL DO YOU HAVE ON A TYPICAL DAY: 1 OR 2
AUDIT-C TOTAL SCORE: 4
HOW OFTEN DO YOU HAVE SIX OR MORE DRINKS ON ONE OCCASION: LESS THAN MONTHLY

## 2024-02-28 ASSESSMENT — SOCIAL DETERMINANTS OF HEALTH (SDOH)
HOW HARD IS IT FOR YOU TO PAY FOR THE VERY BASICS LIKE FOOD, HOUSING, MEDICAL CARE, AND HEATING?: NOT HARD AT ALL
HOW OFTEN DO YOU GET TOGETHER WITH FRIENDS OR RELATIVES?: THREE TIMES A WEEK
HOW OFTEN DO YOU HAVE SIX OR MORE DRINKS ON ONE OCCASION: LESS THAN MONTHLY
IN A TYPICAL WEEK, HOW MANY TIMES DO YOU TALK ON THE PHONE WITH FAMILY, FRIENDS, OR NEIGHBORS?: MORE THAN THREE TIMES A WEEK
IN A TYPICAL WEEK, HOW MANY TIMES DO YOU TALK ON THE PHONE WITH FAMILY, FRIENDS, OR NEIGHBORS?: MORE THAN THREE TIMES A WEEK
ARE YOU MARRIED, WIDOWED, DIVORCED, SEPARATED, NEVER MARRIED, OR LIVING WITH A PARTNER?: NEVER MARRIED
WITHIN THE PAST 12 MONTHS, YOU WORRIED THAT YOUR FOOD WOULD RUN OUT BEFORE YOU GOT THE MONEY TO BUY MORE: NEVER TRUE
HOW OFTEN DO YOU GET TOGETHER WITH FRIENDS OR RELATIVES?: THREE TIMES A WEEK
HOW OFTEN DO YOU ATTENT MEETINGS OF THE CLUB OR ORGANIZATION YOU BELONG TO?: PATIENT DECLINED
ARE YOU MARRIED, WIDOWED, DIVORCED, SEPARATED, NEVER MARRIED, OR LIVING WITH A PARTNER?: NEVER MARRIED
HOW OFTEN DO YOU ATTENT MEETINGS OF THE CLUB OR ORGANIZATION YOU BELONG TO?: PATIENT DECLINED
HOW OFTEN DO YOU ATTEND CHURCH OR RELIGIOUS SERVICES?: NEVER
HOW OFTEN DO YOU HAVE A DRINK CONTAINING ALCOHOL: 2-3 TIMES A WEEK
DO YOU BELONG TO ANY CLUBS OR ORGANIZATIONS SUCH AS CHURCH GROUPS UNIONS, FRATERNAL OR ATHLETIC GROUPS, OR SCHOOL GROUPS?: NO
DO YOU BELONG TO ANY CLUBS OR ORGANIZATIONS SUCH AS CHURCH GROUPS UNIONS, FRATERNAL OR ATHLETIC GROUPS, OR SCHOOL GROUPS?: NO
HOW MANY DRINKS CONTAINING ALCOHOL DO YOU HAVE ON A TYPICAL DAY WHEN YOU ARE DRINKING: 1 OR 2
HOW OFTEN DO YOU ATTEND CHURCH OR RELIGIOUS SERVICES?: NEVER

## 2024-02-29 ENCOUNTER — OFFICE VISIT (OUTPATIENT)
Dept: MEDICAL GROUP | Facility: MEDICAL CENTER | Age: 24
End: 2024-02-29
Payer: COMMERCIAL

## 2024-02-29 VITALS
SYSTOLIC BLOOD PRESSURE: 112 MMHG | BODY MASS INDEX: 27.29 KG/M2 | TEMPERATURE: 98 F | OXYGEN SATURATION: 98 % | DIASTOLIC BLOOD PRESSURE: 62 MMHG | WEIGHT: 139 LBS | HEIGHT: 60 IN | HEART RATE: 54 BPM

## 2024-02-29 DIAGNOSIS — Z00.00 ENCOUNTER FOR PREVENTATIVE ADULT HEALTH CARE EXAMINATION: Primary | ICD-10-CM

## 2024-02-29 PROCEDURE — 3074F SYST BP LT 130 MM HG: CPT | Performed by: FAMILY MEDICINE

## 2024-02-29 PROCEDURE — 99395 PREV VISIT EST AGE 18-39: CPT | Performed by: FAMILY MEDICINE

## 2024-02-29 PROCEDURE — 3078F DIAST BP <80 MM HG: CPT | Performed by: FAMILY MEDICINE

## 2024-02-29 ASSESSMENT — LIFESTYLE VARIABLES
DURING THE PAST 12 MONTHS, ON HOW MANY DAYS DID YOU USE ANY MARIJUANA: 156
PART A TOTAL SCORE: 208
DURING THE PAST 12 MONTHS, ON HOW MANY DAYS DID YOU USE ANYTHING ELSE TO GET HIGH: 0
DURING THE PAST 12 MONTHS, ON HOW MANY DAYS DID YOU DRINK MORE THAN A FEW SIPS OF BEER, WINE, OR ANY DRINK CONTAINING ALCOHOL: 52
DURING THE PAST 12 MONTHS, ON HOW MANY DAYS DID YOU USE ANY TOBACCO OR NICOTINE PRODUCTS: 0

## 2024-02-29 ASSESSMENT — PATIENT HEALTH QUESTIONNAIRE - PHQ9: CLINICAL INTERPRETATION OF PHQ2 SCORE: 0

## 2024-02-29 NOTE — ASSESSMENT & PLAN NOTE
Anticipatory guidance:  --Discussed moderation in sodium/caffeine intake, saturated fat and cholesterol, caloric balance, sufficient fresh fruits/vegetables, fiber, iron, and 0.4-0.8mg of folate supplement per day (for females capable of pregnancy).  --Discussed brushing, flossing, and dental visits.   --Encouraged regular exercise, 150 mins a week of moderate to high intensity cardiovascular activity.   --Discussed tobacco, alcohol, or other drug use; availability of treatment for abuse.   --Discussed sexually transmitted infections, partner selection, use of condoms, avoidance of unintended pregnancy and contraceptive alternatives.  --Injury prevention: Discussed safety belts, safety helmets, smoke detector, etc.  - Discussed vaccinations but they are due for  -Discussed  diet and exercise     Health maintenance: Due for HIV and hepatitis C screening  Patient counseled about skin care, diet, supplements, and exercise.

## 2024-02-29 NOTE — PROGRESS NOTES
Subjective:     CC:   Chief Complaint   Patient presents with    Annual Exam       HPI:   José Manuel Damon is a 23 y.o. female who presents for annual exam    Problem   Encounter for Preventative Adult Health Care Examination    Cancer screening  Colorectal Cancer Screening: Due at 45 years old  Lung Cancer Screening: Non-smoker   Breast Cancer Screening: Due at 40 years old  Hep C screening: Due    Ob-Gyn/ History:    Patient has GYN provider: yes  /Para:  G0  Last Pap Smear: 2023. No history of abnormal pap smears.  Gyn Surgery: No surgery  LMP: 2024  Hx STDs: No  Birth control: Carmel  Menses every month with 28 days with spotting, light bleeding.  Denies cramping.  No significant bloating/fluid retention, pelvic pain, or dyspareunia. No abnormal vaginal discharge.  No breast tenderness, mass, nipple discharge, changes in size or contour, or abnormal cyclic discomfort.  Urinary incontinence: None  Folate intake: Daily multi     Health Maintenance  Advanced directive: N/A  Osteoporosis Screen/ DEXA: N/A  PT/vit D for falls prevention: None   Cholesterol Screenin2019 non-HDL cholesterol 70  Diabetes Screenin2022 glucose 83  Aspirin Use: None     Diet: Regular   Exercise: Daily workouts, on the weekends more outdoor activity     Substance Abuse: No concerns   Seat belts, bike helmet, gun safety discussed.  Sun protection used.  Routine Dental: Daily brushing and follows with a dentist     Immunizations  Influenza: 2023   HPV series: Yes   Tetanus: 2017   Shingles: Due at 50 years old   COVID: Due for booster  Pneumococcal : No risk factors, due at 65 years old  Hep B: Completed series   Other immunizations: None          OB History   No obstetric history on file.      She  reports being sexually active and has had partner(s) who are male. She reports using the following method of birth control/protection: Pill.    She  has a past medical history of Allergy and Anxiety.  She   has a past surgical history that includes tonsillectomy and adenoidectomy and tonsillectomy.    Family History   Problem Relation Age of Onset    Hypertension Mother     Hyperlipidemia Mother     Psychiatric Illness Father         bipolar    Lupus Sister     Hypertension Maternal Grandmother     Diabetes Maternal Grandmother     Heart Disease Maternal Grandfather      Social History     Tobacco Use    Smoking status: Former    Smokeless tobacco: Never   Vaping Use    Vaping Use: Never used   Substance Use Topics    Alcohol use: Yes     Alcohol/week: 3.0 oz     Types: 3 Glasses of wine, 1 Cans of beer, 1 Standard drinks or equivalent per week     Comment: 2 times a week    Drug use: Yes     Types: Marijuana     Comment: occasional       Patient Active Problem List    Diagnosis Date Noted    Bright red blood per rectum 03/03/2022    Elevated fasting glucose 03/03/2022    Ganglion cyst of dorsum of right wrist 12/08/2021    Encounter for preventative adult health care examination 11/18/2021    Ganglion cyst of wrist, right 11/18/2021    Lesion of nipple 07/09/2015     Current Outpatient Medications   Medication Sig Dispense Refill    LO LOESTRIN FE 1 MG-10 MCG / 10 MCG Tab        No current facility-administered medications for this visit.     Allergies   Allergen Reactions    Carmel [Drospirenone-Ethinyl Estradiol] Swelling     Facial swelling         Objective:   /62   Pulse (!) 54   Temp 36.7 °C (98 °F)   Ht 1.524 m (5')   Wt 63 kg (139 lb)   SpO2 98%   BMI 27.15 kg/m²     Wt Readings from Last 4 Encounters:   02/29/24 63 kg (139 lb)   12/22/23 64.2 kg (141 lb 9.6 oz)   11/25/23 63.5 kg (140 lb)   03/03/23 61.2 kg (135 lb)     Physical Exam  Constitutional:       General: She is not in acute distress.  HENT:      Head: Normocephalic and atraumatic.      Right Ear: Tympanic membrane and external ear normal.      Left Ear: Tympanic membrane and external ear normal.      Nose: No nasal deformity.       Mouth/Throat:      Lips: Pink.      Mouth: Mucous membranes are moist.      Pharynx: Oropharynx is clear. Uvula midline. No posterior oropharyngeal erythema.   Eyes:      General: Lids are normal.      Extraocular Movements: Extraocular movements intact.      Conjunctiva/sclera: Conjunctivae normal.      Pupils: Pupils are equal, round, and reactive to light.   Neck:      Trachea: Trachea normal.   Cardiovascular:      Rate and Rhythm: Normal rate and regular rhythm.      Heart sounds: Normal heart sounds. No murmur heard.     No friction rub. No gallop.   Pulmonary:      Effort: Pulmonary effort is normal. No accessory muscle usage.      Breath sounds: Normal breath sounds. No wheezing or rales.   Abdominal:      General: Bowel sounds are normal.      Palpations: Abdomen is soft.      Tenderness: There is no abdominal tenderness.   Musculoskeletal:      Cervical back: Normal range of motion and neck supple.      Right lower leg: No edema.      Left lower leg: No edema.   Lymphadenopathy:      Cervical: No cervical adenopathy.   Skin:     General: Skin is warm and dry.      Findings: No rash.   Neurological:      General: No focal deficit present.      Mental Status: She is alert and oriented to person, place, and time. Mental status is at baseline.      GCS: GCS eye subscore is 4. GCS verbal subscore is 5. GCS motor subscore is 6.      Motor: No weakness.      Gait: Gait is intact.   Psychiatric:         Attention and Perception: Attention normal.         Mood and Affect: Mood and affect normal.         Speech: Speech normal.         Imaging:  No imaging    Labs:  No updated labs    Assessment and Plan:        Problem List Items Addressed This Visit       Encounter for preventative adult health care examination - Primary     Anticipatory guidance:  --Discussed moderation in sodium/caffeine intake, saturated fat and cholesterol, caloric balance, sufficient fresh fruits/vegetables, fiber, iron, and 0.4-0.8mg of  folate supplement per day (for females capable of pregnancy).  --Discussed brushing, flossing, and dental visits.   --Encouraged regular exercise, 150 mins a week of moderate to high intensity cardiovascular activity.   --Discussed tobacco, alcohol, or other drug use; availability of treatment for abuse.   --Discussed sexually transmitted infections, partner selection, use of condoms, avoidance of unintended pregnancy and contraceptive alternatives.  --Injury prevention: Discussed safety belts, safety helmets, smoke detector, etc.  - Discussed vaccinations but they are due for  -Discussed  diet and exercise     Health maintenance: Due for HIV and hepatitis C screening  Patient counseled about skin care, diet, supplements, and exercise.             Follow-up: Return in about 1 year (around 2/28/2025) for Annual.       Please note that this dictation was created using voice recognition software. I have made every reasonable attempt to correct obvious errors, but I expect that there are errors of grammar and possibly content that I did not discover before finalizing the note.